# Patient Record
Sex: FEMALE | Race: BLACK OR AFRICAN AMERICAN | NOT HISPANIC OR LATINO
[De-identification: names, ages, dates, MRNs, and addresses within clinical notes are randomized per-mention and may not be internally consistent; named-entity substitution may affect disease eponyms.]

---

## 2017-03-02 ENCOUNTER — NON-APPOINTMENT (OUTPATIENT)
Age: 57
End: 2017-03-02

## 2017-03-02 ENCOUNTER — APPOINTMENT (OUTPATIENT)
Dept: CARDIOLOGY | Facility: CLINIC | Age: 57
End: 2017-03-02

## 2017-03-02 VITALS
SYSTOLIC BLOOD PRESSURE: 126 MMHG | WEIGHT: 205 LBS | OXYGEN SATURATION: 98 % | HEART RATE: 87 BPM | BODY MASS INDEX: 27.77 KG/M2 | HEIGHT: 72 IN | DIASTOLIC BLOOD PRESSURE: 89 MMHG

## 2017-03-02 DIAGNOSIS — Z86.79 PERSONAL HISTORY OF OTHER DISEASES OF THE CIRCULATORY SYSTEM: ICD-10-CM

## 2017-03-03 ENCOUNTER — TRANSCRIPTION ENCOUNTER (OUTPATIENT)
Age: 57
End: 2017-03-03

## 2017-03-07 ENCOUNTER — FORM ENCOUNTER (OUTPATIENT)
Age: 57
End: 2017-03-07

## 2017-03-30 ENCOUNTER — APPOINTMENT (OUTPATIENT)
Dept: CV DIAGNOSITCS | Facility: HOSPITAL | Age: 57
End: 2017-03-30

## 2017-03-30 ENCOUNTER — OUTPATIENT (OUTPATIENT)
Dept: OUTPATIENT SERVICES | Facility: HOSPITAL | Age: 57
LOS: 1 days | End: 2017-03-30
Payer: COMMERCIAL

## 2017-03-30 DIAGNOSIS — I47.1 SUPRAVENTRICULAR TACHYCARDIA: ICD-10-CM

## 2017-03-30 PROCEDURE — 93306 TTE W/DOPPLER COMPLETE: CPT

## 2017-03-30 PROCEDURE — 93306 TTE W/DOPPLER COMPLETE: CPT | Mod: 26

## 2017-05-11 ENCOUNTER — RX RENEWAL (OUTPATIENT)
Age: 57
End: 2017-05-11

## 2017-06-16 ENCOUNTER — OUTPATIENT (OUTPATIENT)
Dept: OUTPATIENT SERVICES | Facility: HOSPITAL | Age: 57
LOS: 1 days | End: 2017-06-16
Payer: COMMERCIAL

## 2017-06-16 ENCOUNTER — APPOINTMENT (OUTPATIENT)
Age: 57
End: 2017-06-16

## 2017-06-16 DIAGNOSIS — M47.16 OTHER SPONDYLOSIS WITH MYELOPATHY, LUMBAR REGION: ICD-10-CM

## 2017-06-16 DIAGNOSIS — M47.817 SPONDYLOSIS WITHOUT MYELOPATHY OR RADICULOPATHY, LUMBOSACRAL REGION: ICD-10-CM

## 2017-06-16 PROCEDURE — 64493 INJ PARAVERT F JNT L/S 1 LEV: CPT

## 2017-06-16 PROCEDURE — 64494 INJ PARAVERT F JNT L/S 2 LEV: CPT

## 2017-07-24 ENCOUNTER — MEDICATION RENEWAL (OUTPATIENT)
Age: 57
End: 2017-07-24

## 2017-09-28 ENCOUNTER — NON-APPOINTMENT (OUTPATIENT)
Age: 57
End: 2017-09-28

## 2017-09-28 ENCOUNTER — APPOINTMENT (OUTPATIENT)
Dept: CARDIOLOGY | Facility: CLINIC | Age: 57
End: 2017-09-28
Payer: COMMERCIAL

## 2017-09-28 VITALS
SYSTOLIC BLOOD PRESSURE: 111 MMHG | OXYGEN SATURATION: 98 % | HEART RATE: 55 BPM | HEIGHT: 72 IN | WEIGHT: 173 LBS | BODY MASS INDEX: 23.43 KG/M2 | DIASTOLIC BLOOD PRESSURE: 75 MMHG

## 2017-09-28 PROCEDURE — 99215 OFFICE O/P EST HI 40 MIN: CPT

## 2017-09-28 PROCEDURE — 93000 ELECTROCARDIOGRAM COMPLETE: CPT

## 2017-11-03 ENCOUNTER — OUTPATIENT (OUTPATIENT)
Dept: OUTPATIENT SERVICES | Facility: HOSPITAL | Age: 57
LOS: 1 days | End: 2017-11-03
Payer: COMMERCIAL

## 2017-11-03 ENCOUNTER — APPOINTMENT (OUTPATIENT)
Age: 57
End: 2017-11-03

## 2017-11-03 DIAGNOSIS — M47.816 SPONDYLOSIS WITHOUT MYELOPATHY OR RADICULOPATHY, LUMBAR REGION: ICD-10-CM

## 2017-11-03 DIAGNOSIS — M54.16 RADICULOPATHY, LUMBAR REGION: ICD-10-CM

## 2017-11-03 PROCEDURE — 64635 DESTROY LUMB/SAC FACET JNT: CPT

## 2017-11-03 PROCEDURE — 64636 DESTROY L/S FACET JNT ADDL: CPT

## 2018-04-20 ENCOUNTER — APPOINTMENT (OUTPATIENT)
Age: 58
End: 2018-04-20

## 2018-04-20 ENCOUNTER — OUTPATIENT (OUTPATIENT)
Dept: OUTPATIENT SERVICES | Facility: HOSPITAL | Age: 58
LOS: 1 days | End: 2018-04-20
Payer: COMMERCIAL

## 2018-04-20 DIAGNOSIS — M54.16 RADICULOPATHY, LUMBAR REGION: ICD-10-CM

## 2018-04-20 PROCEDURE — 64494 INJ PARAVERT F JNT L/S 2 LEV: CPT

## 2018-04-20 PROCEDURE — 64493 INJ PARAVERT F JNT L/S 1 LEV: CPT

## 2018-04-23 DIAGNOSIS — M47.816 SPONDYLOSIS WITHOUT MYELOPATHY OR RADICULOPATHY, LUMBAR REGION: ICD-10-CM

## 2018-11-02 ENCOUNTER — APPOINTMENT (OUTPATIENT)
Dept: ANESTHESIOLOGY | Facility: CLINIC | Age: 58
End: 2018-11-02

## 2018-11-02 ENCOUNTER — OUTPATIENT (OUTPATIENT)
Dept: OUTPATIENT SERVICES | Facility: HOSPITAL | Age: 58
LOS: 1 days | End: 2018-11-02
Payer: COMMERCIAL

## 2018-11-02 DIAGNOSIS — M54.16 RADICULOPATHY, LUMBAR REGION: ICD-10-CM

## 2018-11-02 PROCEDURE — 64494 INJ PARAVERT F JNT L/S 2 LEV: CPT

## 2018-11-02 PROCEDURE — 64493 INJ PARAVERT F JNT L/S 1 LEV: CPT

## 2018-11-05 DIAGNOSIS — M47.817 SPONDYLOSIS WITHOUT MYELOPATHY OR RADICULOPATHY, LUMBOSACRAL REGION: ICD-10-CM

## 2019-06-21 ENCOUNTER — OUTPATIENT (OUTPATIENT)
Dept: OUTPATIENT SERVICES | Facility: HOSPITAL | Age: 59
LOS: 1 days | End: 2019-06-21
Payer: COMMERCIAL

## 2019-06-21 ENCOUNTER — APPOINTMENT (OUTPATIENT)
Dept: MRI IMAGING | Facility: CLINIC | Age: 59
End: 2019-06-21
Payer: COMMERCIAL

## 2019-06-21 DIAGNOSIS — Z00.8 ENCOUNTER FOR OTHER GENERAL EXAMINATION: ICD-10-CM

## 2019-06-21 PROCEDURE — A9585: CPT

## 2019-06-21 PROCEDURE — 75561 CARDIAC MRI FOR MORPH W/DYE: CPT | Mod: 26

## 2019-06-21 PROCEDURE — 75561 CARDIAC MRI FOR MORPH W/DYE: CPT

## 2019-06-24 ENCOUNTER — APPOINTMENT (OUTPATIENT)
Dept: SURGERY | Facility: CLINIC | Age: 59
End: 2019-06-24
Payer: COMMERCIAL

## 2019-06-24 DIAGNOSIS — Z86.2 PERSONAL HISTORY OF DISEASES OF THE BLOOD AND BLOOD-FORMING ORGANS AND CERTAIN DISORDERS INVOLVING THE IMMUNE MECHANISM: ICD-10-CM

## 2019-06-24 PROCEDURE — 99204 OFFICE O/P NEW MOD 45 MIN: CPT

## 2019-06-24 NOTE — CONSULT LETTER
[Dear  ___] : Dear  [unfilled], [Consult Letter:] : I had the pleasure of evaluating your patient, [unfilled]. [Consult Closing:] : Thank you very much for allowing me to participate in the care of this patient.  If you have any questions, please do not hesitate to contact me. [Please see my note below.] : Please see my note below. [FreeTextEntry3] : Sincerely yours,\par \par Chandrika Ortiz MD, FACS\par Assistant Professor of Surgery\par [facility] [FreeTextEntry2] : Dr. Lynn Allen [DrDarya  ___] : Dr. NELSON

## 2019-06-24 NOTE — HISTORY OF PRESENT ILLNESS
[FreeTextEntry1] : Patient referred by Dr. Allen for evaluation of newly diagnosed right breast DCIS. Bilateral mammogram March 2019 additional views recommended. April 19, 2019: 4 mm group of amorphous calcifications right anterior upper outer breast. Core biopsy performed May 3, 2019: DCIS low nuclear grade. Patient denies breast mass, nipple discharge or prior biopsy. Menarche 15, G6, P3, menopause at 49, denies HRT

## 2019-06-24 NOTE — PHYSICAL EXAM
[Normocephalic] : normocephalic [EOMI] : extra ocular movement intact [Sclera nonicteric] : sclera nonicteric [Supple] : supple [No Supraclavicular Adenopathy] : no supraclavicular adenopathy [No Cervical Adenopathy] : no cervical adenopathy [No Thyromegaly] : no thyromegaly [Examined in the supine and seated position] : examined in the supine and seated position [Symmetrical] : symmetrical [No dominant masses] : no dominant masses in right breast  [No dominant masses] : no dominant masses left breast [No Nipple Retraction] : no left nipple retraction [No Nipple Discharge] : no right nipple discharge [Soft] : abdomen soft [No Axillary Lymphadenopathy] : no left axillary lymphadenopathy [No Swelling] : no swelling [No Rashes] : no rashes [de-identified] : tall thin woman

## 2019-06-24 NOTE — ASSESSMENT
[FreeTextEntry1] : Patient with newly diagnosed right breast DCIS. I've requested a breast MRI for preoperative evaluation. If localized, right  damon  guided partial mastectomy would be appropriate. Possible need of radiation and/or additional treatment discussed. Risks reviewed, questions answered. Patient will contact the office to schedule procedure.

## 2019-06-28 ENCOUNTER — FORM ENCOUNTER (OUTPATIENT)
Age: 59
End: 2019-06-28

## 2019-06-29 ENCOUNTER — APPOINTMENT (OUTPATIENT)
Dept: MRI IMAGING | Facility: IMAGING CENTER | Age: 59
End: 2019-06-29
Payer: COMMERCIAL

## 2019-06-29 ENCOUNTER — OUTPATIENT (OUTPATIENT)
Dept: OUTPATIENT SERVICES | Facility: HOSPITAL | Age: 59
LOS: 1 days | End: 2019-06-29
Payer: COMMERCIAL

## 2019-06-29 DIAGNOSIS — D05.11 INTRADUCTAL CARCINOMA IN SITU OF RIGHT BREAST: ICD-10-CM

## 2019-06-29 PROCEDURE — C8937: CPT

## 2019-06-29 PROCEDURE — A9585: CPT

## 2019-06-29 PROCEDURE — 77049 MRI BREAST C-+ W/CAD BI: CPT | Mod: 26

## 2019-06-29 PROCEDURE — C8908: CPT

## 2019-07-17 ENCOUNTER — FORM ENCOUNTER (OUTPATIENT)
Age: 59
End: 2019-07-17

## 2019-07-18 ENCOUNTER — RESULT REVIEW (OUTPATIENT)
Age: 59
End: 2019-07-18

## 2019-07-18 ENCOUNTER — OUTPATIENT (OUTPATIENT)
Dept: OUTPATIENT SERVICES | Facility: HOSPITAL | Age: 59
LOS: 1 days | End: 2019-07-18
Payer: COMMERCIAL

## 2019-07-18 ENCOUNTER — EMERGENCY (EMERGENCY)
Facility: HOSPITAL | Age: 59
LOS: 1 days | Discharge: HOME | End: 2019-07-18
Attending: EMERGENCY MEDICINE | Admitting: EMERGENCY MEDICINE
Payer: COMMERCIAL

## 2019-07-18 ENCOUNTER — FORM ENCOUNTER (OUTPATIENT)
Age: 59
End: 2019-07-18

## 2019-07-18 ENCOUNTER — APPOINTMENT (OUTPATIENT)
Dept: MRI IMAGING | Facility: IMAGING CENTER | Age: 59
End: 2019-07-18
Payer: COMMERCIAL

## 2019-07-18 VITALS
OXYGEN SATURATION: 100 % | DIASTOLIC BLOOD PRESSURE: 70 MMHG | RESPIRATION RATE: 18 BRPM | TEMPERATURE: 99 F | HEART RATE: 77 BPM | SYSTOLIC BLOOD PRESSURE: 101 MMHG

## 2019-07-18 VITALS
RESPIRATION RATE: 18 BRPM | HEART RATE: 57 BPM | DIASTOLIC BLOOD PRESSURE: 83 MMHG | SYSTOLIC BLOOD PRESSURE: 124 MMHG | TEMPERATURE: 98 F | OXYGEN SATURATION: 100 %

## 2019-07-18 DIAGNOSIS — Z00.8 ENCOUNTER FOR OTHER GENERAL EXAMINATION: ICD-10-CM

## 2019-07-18 LAB
ALBUMIN SERPL ELPH-MCNC: 3.9 G/DL — SIGNIFICANT CHANGE UP (ref 3.3–5)
ALP SERPL-CCNC: 68 U/L — SIGNIFICANT CHANGE UP (ref 40–120)
ALT FLD-CCNC: 21 U/L — SIGNIFICANT CHANGE UP (ref 4–33)
ANION GAP SERPL CALC-SCNC: 11 MMO/L — SIGNIFICANT CHANGE UP (ref 7–14)
AST SERPL-CCNC: 17 U/L — SIGNIFICANT CHANGE UP (ref 4–32)
BASOPHILS # BLD AUTO: 0.05 K/UL — SIGNIFICANT CHANGE UP (ref 0–0.2)
BASOPHILS NFR BLD AUTO: 1.4 % — SIGNIFICANT CHANGE UP (ref 0–2)
BILIRUB SERPL-MCNC: 1.2 MG/DL — SIGNIFICANT CHANGE UP (ref 0.2–1.2)
BUN SERPL-MCNC: 16 MG/DL — SIGNIFICANT CHANGE UP (ref 7–23)
CALCIUM SERPL-MCNC: 9.7 MG/DL — SIGNIFICANT CHANGE UP (ref 8.4–10.5)
CHLORIDE SERPL-SCNC: 106 MMOL/L — SIGNIFICANT CHANGE UP (ref 98–107)
CO2 SERPL-SCNC: 26 MMOL/L — SIGNIFICANT CHANGE UP (ref 22–31)
CREAT SERPL-MCNC: 0.88 MG/DL — SIGNIFICANT CHANGE UP (ref 0.5–1.3)
EOSINOPHIL # BLD AUTO: 0.03 K/UL — SIGNIFICANT CHANGE UP (ref 0–0.5)
EOSINOPHIL NFR BLD AUTO: 0.8 % — SIGNIFICANT CHANGE UP (ref 0–6)
GLUCOSE SERPL-MCNC: 105 MG/DL — HIGH (ref 70–99)
HCT VFR BLD CALC: 39 % — SIGNIFICANT CHANGE UP (ref 34.5–45)
HGB BLD-MCNC: 12.5 G/DL — SIGNIFICANT CHANGE UP (ref 11.5–15.5)
IMM GRANULOCYTES NFR BLD AUTO: 0.3 % — SIGNIFICANT CHANGE UP (ref 0–1.5)
LYMPHOCYTES # BLD AUTO: 0.72 K/UL — LOW (ref 1–3.3)
LYMPHOCYTES # BLD AUTO: 19.9 % — SIGNIFICANT CHANGE UP (ref 13–44)
MCHC RBC-ENTMCNC: 31.9 PG — SIGNIFICANT CHANGE UP (ref 27–34)
MCHC RBC-ENTMCNC: 32.1 % — SIGNIFICANT CHANGE UP (ref 32–36)
MCV RBC AUTO: 99.5 FL — SIGNIFICANT CHANGE UP (ref 80–100)
MONOCYTES # BLD AUTO: 0.33 K/UL — SIGNIFICANT CHANGE UP (ref 0–0.9)
MONOCYTES NFR BLD AUTO: 9.1 % — SIGNIFICANT CHANGE UP (ref 2–14)
NEUTROPHILS # BLD AUTO: 2.48 K/UL — SIGNIFICANT CHANGE UP (ref 1.8–7.4)
NEUTROPHILS NFR BLD AUTO: 68.5 % — SIGNIFICANT CHANGE UP (ref 43–77)
NRBC # FLD: 0 K/UL — SIGNIFICANT CHANGE UP (ref 0–0)
PLATELET # BLD AUTO: 158 K/UL — SIGNIFICANT CHANGE UP (ref 150–400)
PMV BLD: 10.6 FL — SIGNIFICANT CHANGE UP (ref 7–13)
POTASSIUM SERPL-MCNC: 3.7 MMOL/L — SIGNIFICANT CHANGE UP (ref 3.5–5.3)
POTASSIUM SERPL-SCNC: 3.7 MMOL/L — SIGNIFICANT CHANGE UP (ref 3.5–5.3)
PROT SERPL-MCNC: 6.9 G/DL — SIGNIFICANT CHANGE UP (ref 6–8.3)
RBC # BLD: 3.92 M/UL — SIGNIFICANT CHANGE UP (ref 3.8–5.2)
RBC # FLD: 13.1 % — SIGNIFICANT CHANGE UP (ref 10.3–14.5)
SODIUM SERPL-SCNC: 143 MMOL/L — SIGNIFICANT CHANGE UP (ref 135–145)
TROPONIN T, HIGH SENSITIVITY: < 6 NG/L — SIGNIFICANT CHANGE UP (ref ?–14)
TROPONIN T, HIGH SENSITIVITY: < 6 NG/L — SIGNIFICANT CHANGE UP (ref ?–14)
WBC # BLD: 3.62 K/UL — LOW (ref 3.8–10.5)
WBC # FLD AUTO: 3.62 K/UL — LOW (ref 3.8–10.5)

## 2019-07-18 PROCEDURE — 71045 X-RAY EXAM CHEST 1 VIEW: CPT | Mod: 26

## 2019-07-18 PROCEDURE — A9585: CPT

## 2019-07-18 PROCEDURE — 77065 DX MAMMO INCL CAD UNI: CPT

## 2019-07-18 PROCEDURE — 88305 TISSUE EXAM BY PATHOLOGIST: CPT | Mod: 26

## 2019-07-18 PROCEDURE — 19085 BX BREAST 1ST LESION MR IMAG: CPT

## 2019-07-18 PROCEDURE — 19085 BX BREAST 1ST LESION MR IMAG: CPT | Mod: RT

## 2019-07-18 PROCEDURE — 93010 ELECTROCARDIOGRAM REPORT: CPT

## 2019-07-18 PROCEDURE — 99285 EMERGENCY DEPT VISIT HI MDM: CPT | Mod: 25

## 2019-07-18 PROCEDURE — 88305 TISSUE EXAM BY PATHOLOGIST: CPT

## 2019-07-18 RX ORDER — SODIUM CHLORIDE 9 MG/ML
1000 INJECTION INTRAMUSCULAR; INTRAVENOUS; SUBCUTANEOUS ONCE
Refills: 0 | Status: COMPLETED | OUTPATIENT
Start: 2019-07-18 | End: 2019-07-18

## 2019-07-18 RX ADMIN — SODIUM CHLORIDE 1000 MILLILITER(S): 9 INJECTION INTRAMUSCULAR; INTRAVENOUS; SUBCUTANEOUS at 17:40

## 2019-07-18 NOTE — ED ADULT NURSE NOTE - OBJECTIVE STATEMENT
Pt received in intake spot 3, A&Ox4, NAD.  c/o near syncopal episode during biopsy after receiving lido with epi.  Pt states started to feel hot, dizzy and diaphoretic.  22g to R AC, placed by EMS, flushes without difficulty.  Labs sent.  Will continue to monitor.

## 2019-07-18 NOTE — ED PROVIDER NOTE - OBJECTIVE STATEMENT
59F hx of HTN, possible sarcoidosis (pt states pet scan/MRI was negative but has pulmonary calcifications), slightly large aorta' 59F hx of HTN, afib on aspirin, possible sarcoidosis (pt states pet scan/MRI was negative but has pulmonary calcifications), slightly large aortic valve' presenting with near syncope event while on the MRI table while getting an MRI guided left breast biopsy. Patient states she was initially uncomfortable on the table, 'feeling stuffy', with right shoulder pain due to the way her arms were situated on the table. States when they injected the lido/with epi at the breast biopsy site and IV (15 cc total dosed separately), she felt like she would faint. Never lost consciousness,  No nausea/vomiting. No dizziness, no weakness/ numbness, Notes blurry vision x 6 months. Per doctor who did biopsy, symptoms lasted like 10 min together, and pulses felt 'thready and slow', but vitals were not able to be obtained. Last cardiology appt was 2 weeks ago, has one scheduled for tomorrow. 59F hx of HTN, afib on aspirin, possible sarcoidosis (pt states pet scan/MRI was negative but has pulmonary calcifications), slightly large aortic valve' presenting with near syncope event approx 2-230pm while on the MRI table while getting an MRI guided left breast biopsy. Patient states she was initially uncomfortable on the table, 'feeling stuffy', with right shoulder pain due to the way her arms were situated on the table. States when they injected the lido/with epi at the breast biopsy site and IV (15 cc total dosed separately), she felt like she would faint. Never lost consciousness,  No nausea/vomiting. No dizziness, no weakness/ numbness, Notes blurry vision x 6 months. Per doctor who did biopsy, symptoms lasted like 10 min together, and pulses felt 'thready and slow', but vitals were not able to be obtained. Last cardiology appt was 2 weeks ago, has one scheduled for tomorrow.

## 2019-07-18 NOTE — ED PROVIDER NOTE - PROGRESS NOTE DETAILS
Pandey PGY3: reassessed, ambulatory, alert, asymptomatic, after rpt trop, stable for dc and f/u, has cards appt tomorrow, will sign out to night team. Caprice Saucedo MD PGY-2 pt repeat trop <6. No further bleeding after bandage placed over biopsy site. encouraged to f/u with team that performed biopsy tomorrow. Will give strict return precautions Caprice Saucedo MD PGY-2 pt repeat trop <6. No further bleeding after bandage placed over biopsy site. encouraged to f/u with team that performed biopsy tomorrow. Will give strict return precautions. Info provided on near-syncope

## 2019-07-18 NOTE — ED PROVIDER NOTE - CLINICAL SUMMARY MEDICAL DECISION MAKING FREE TEXT BOX
59F with presyncope, likely vasovagal in the setting of being face down for MRI and discomfort, will check labs, rehydrate, xray, ekg wnl, sinus, and reassess

## 2019-07-18 NOTE — ED PROVIDER NOTE - NSFOLLOWUPINSTRUCTIONS_ED_ALL_ED_FT
1. You were seen in the Emergency Room for feeling faint. Your heart enzymes were within normal limits  2. Please follow up with your doctor who performed the biopsy tomorrow and your cardiologist tomorrow as scheduled  3. Return to the emergency room or seek immediate assistance for any new or concerning symptoms (such as fevers, chills, worsening pain or bleeding, worsening lightheadedness or feeling faint ), or if you get worse.   4. Copies of your tests were provided to you for follow-up.  You must address all your findings with your doctor.

## 2019-07-18 NOTE — ED PROVIDER NOTE - ATTENDING CONTRIBUTION TO CARE
59F h/o afib on aspirin presents with near syncope. Was getting an MRI guided breast biopsy, states she was feeling very uncomfortable, got an injection and had local anesthesia, started to feel weak and lightheadedness. No CP. By report she had a “thready pulse” but unable to get vitals immediately. No prior cardiac hx. Now feels well. Has cardiology appt tomorrow. On exam well appearing, nad, mmm, rrr, lungs clear, abd soft NT/ND, 2+ pulses, no edema, no rash, alert, speech clear. EKG no acute ischemia or arrythmia. Symptoms c/w vasovagal episode. Plan for labs, re-eval, already has close cards f/u

## 2019-07-18 NOTE — ED ADULT TRIAGE NOTE - CHIEF COMPLAINT QUOTE
Pt was having MRI  of right  breast after receiving Lidocaine with epi became diaphoretic  and felt like passing put. Pt did not have any chest pain. Pt arrives with iv to Right a/c. 600 cc ns received.

## 2019-07-19 ENCOUNTER — RESULT REVIEW (OUTPATIENT)
Age: 59
End: 2019-07-19

## 2019-07-19 PROCEDURE — 77065 DX MAMMO INCL CAD UNI: CPT | Mod: 26,RT

## 2019-07-26 ENCOUNTER — OUTPATIENT (OUTPATIENT)
Dept: OUTPATIENT SERVICES | Facility: HOSPITAL | Age: 59
LOS: 1 days | End: 2019-07-26

## 2019-07-26 VITALS
TEMPERATURE: 98 F | DIASTOLIC BLOOD PRESSURE: 65 MMHG | WEIGHT: 184.97 LBS | HEIGHT: 72 IN | OXYGEN SATURATION: 100 % | SYSTOLIC BLOOD PRESSURE: 100 MMHG | HEART RATE: 60 BPM | RESPIRATION RATE: 16 BRPM

## 2019-07-26 DIAGNOSIS — Z98.890 OTHER SPECIFIED POSTPROCEDURAL STATES: Chronic | ICD-10-CM

## 2019-07-26 DIAGNOSIS — Z98.51 TUBAL LIGATION STATUS: Chronic | ICD-10-CM

## 2019-07-26 DIAGNOSIS — Z01.818 ENCOUNTER FOR OTHER PREPROCEDURAL EXAMINATION: ICD-10-CM

## 2019-07-26 DIAGNOSIS — C50.911 MALIGNANT NEOPLASM OF UNSPECIFIED SITE OF RIGHT FEMALE BREAST: ICD-10-CM

## 2019-07-26 RX ORDER — SODIUM CHLORIDE 9 MG/ML
1000 INJECTION, SOLUTION INTRAVENOUS
Refills: 0 | Status: DISCONTINUED | OUTPATIENT
Start: 2019-08-09 | End: 2019-08-09

## 2019-07-26 RX ORDER — SODIUM CHLORIDE 9 MG/ML
3 INJECTION INTRAMUSCULAR; INTRAVENOUS; SUBCUTANEOUS ONCE
Refills: 0 | Status: DISCONTINUED | OUTPATIENT
Start: 2019-08-09 | End: 2019-08-30

## 2019-07-26 RX ORDER — METOPROLOL TARTRATE 50 MG
1 TABLET ORAL
Qty: 0 | Refills: 0 | DISCHARGE

## 2019-07-26 RX ORDER — ASPIRIN/CALCIUM CARB/MAGNESIUM 324 MG
1 TABLET ORAL
Qty: 0 | Refills: 0 | DISCHARGE

## 2019-07-26 NOTE — H&P PST ADULT - BREAST SYMPTOMS
breast lump R/breast tenderness R breast tenderness R/breast lump R/Reports lump and tenderness appeared after biopsy

## 2019-07-26 NOTE — H&P PST ADULT - NEGATIVE GENERAL GENITOURINARY SYMPTOMS
no urine discoloration/no dysuria/normal urinary frequency/no hematuria/no flank pain R/no flank pain L

## 2019-07-26 NOTE — H&P PST ADULT - NSICDXPROBLEM_GEN_ALL_CORE_FT
DX:    Malignant neoplasm of right breast  Plan: Patient scheduled for surgery on 8/9/19  Patient provided with verbal and written presurgical instructions, patient verbalized understanding  with teach back.     Patient provided with famotidine for GI prophylaxis, patient verbalized understanding.    Patient provided with Chlorhexidine wash, verbal and written instructions. Patient verbalized understanding with teach back.  As per surgeon and PST request cardiac and medical clearance in chart. Recent echo requested    Dx Atrial fibrillation  Patient taking aspirin 325 mg daily**  Patient instructed to take beta blocker as prescribed DX:    Malignant neoplasm of right breast  Plan: Patient scheduled for surgery on 8/9/19  Patient provided with verbal and written presurgical instructions, patient verbalized understanding  with teach back.     Patient provided with famotidine for GI prophylaxis, patient verbalized understanding.    Patient provided with Chlorhexidine wash, verbal and written instructions. Patient verbalized understanding with teach back.  As per surgeon and PST request cardiac and medical clearance done and requested. Recent echo requested    Dx Atrial fibrillation  Patient taking aspirin 325 mg daily**  Patient instructed to take beta blocker as prescribed DX:    Malignant neoplasm of right breast  Plan: Patient scheduled for surgery on 8/9/19  Patient provided with verbal and written presurgical instructions, patient verbalized understanding  with teach back.     Patient provided with famotidine for GI prophylaxis, patient verbalized understanding.    Patient provided with Chlorhexidine wash, verbal and written instructions. Patient verbalized understanding with teach back.  Medical and cardiac clearance requested by surgeon , will obtain document and recent echo    Dx Atrial fibrillation  Patient taking aspirin 325 mg daily, message left with Radha at  Dr Buck office, pending call back for aspirin plan  Patient instructed to take beta blocker as prescribed DX:    Malignant neoplasm of right breast  Plan: Patient scheduled for surgery on 8/9/19  Patient provided with verbal and written presurgical instructions, patient verbalized understanding  with teach back.     Patient provided with famotidine for GI prophylaxis, patient verbalized understanding.    Patient provided with Chlorhexidine wash, verbal and written instructions. Patient verbalized understanding with teach back.  Medical and cardiac clearance requested by surgeon , will obtain document and recent echo    Dx Atrial fibrillation  Patient taking aspirin 325 mg daily, Case discussed with Dr Zhang patient to continue aspirin, surgeon notified by email  Patient instructed to take beta blocker as prescribed DX:    Malignant neoplasm of right breast  Plan: Patient scheduled for surgery on 8/9/19  Patient provided with verbal and written presurgical instructions, patient verbalized understanding  with teach back.     Patient provided with famotidine for GI prophylaxis, patient verbalized understanding.    Patient provided with Chlorhexidine wash, verbal and written instructions. Patient verbalized understanding with teach back.  Medical and cardiac clearance requested by surgeon and PST, will obtain document and recent echo    Dx Atrial fibrillation  Patient taking aspirin 325 mg daily, Case discussed with Dr Zhang patient to continue aspirin, surgeon notified by email  Patient instructed to take beta blocker as prescribed

## 2019-07-26 NOTE — H&P PST ADULT - ASSESSMENT
59 year old female presents with abnormal mammogram results of right breat 59 year old female presents with abnormal mammogram results of right breast

## 2019-07-26 NOTE — H&P PST ADULT - HISTORY OF PRESENT ILLNESS
Patient presents today with past medical history of A-FIB,  Presents today for presurgical evaluation for ... on ... Patient presents today with past medical history of controlled A-FIB currently managed with beta blocker and full dose aspirin,  Patient reports an abnoemal mammogram in April, needle biopsy indicated malignancy. Presents today for presurgical evaluation for right partial mastectomy with wire braketted localization on 8/9/19 Patient presents today with past medical history of controlled A-FIB currently managed with beta blocker and full dose aspirin,  Patient reports an abnormal mammogram in April, needle biopsy indicated malignancy. Presents today for presurgical evaluation for right partial mastectomy with wire braketted localization on 8/9/19

## 2019-07-26 NOTE — H&P PST ADULT - NSICDXPASTMEDICALHX_GEN_ALL_CORE_FT
PAST MEDICAL HISTORY:  Atrial fibrillation     History of thoracic aortic aneurysm repair Noted on CT scan 3/2019 PAST MEDICAL HISTORY:  Atrial fibrillation Diagnosed in 2014. controlled, managed with aspirin and beta blocker    History of thoracic aortic aneurysm repair Noted on CT scan 3/2019 PAST MEDICAL HISTORY:  Atrial fibrillation Diagnosed in 2014. controlled, managed with aspirin and beta blocker    Thoracic aortic aneurysm noted on CT scan on 3/2019 PAST MEDICAL HISTORY:  Atrial fibrillation Diagnosed in 2014. controlled, managed with aspirin and beta blocker    Thoracic aortic aneurysm 4 cm noted on CT scan on 3/2019

## 2019-07-26 NOTE — H&P PST ADULT - SYMPTOMS
dyspnea/palpitations/occasional episodes of dyspnea when walking, lasting 1-2 minutes, denies dizziness, LOC,. Paliptaions occur spontaneously preipiated by stress lasting 1-2 minutes denies LOC, chest pain palpitations/occasional episodes of dyspnea when walking, lasting 1-2 minutes, denies dizziness, LOC, CP. Palpitaions occur spontaneously preipiated by stress lasting 1-2 minutes denies LOC, chest pain or CP/dyspnea chronic SOB and palpitations managed with metoprolol/dyspnea/palpitations

## 2019-07-26 NOTE — H&P PST ADULT - NSANTHOSAYNRD_GEN_A_CORE
No. NAOMY screening performed.  STOP BANG Legend: 0-2 = LOW Risk; 3-4 = INTERMEDIATE Risk; 5-8 = HIGH Risk

## 2019-07-26 NOTE — H&P PST ADULT - CARDIOVASCULAR COMMENTS
Pt reports a vaso-vagal response during MRI procedure, denies LOC. Patient went for evaluation at [facility] ER Pt reports a vaso-vagal response earlier this month, denies LOC. Patient went for evaluation at [facility] ER- w/u negative

## 2019-07-26 NOTE — H&P PST ADULT - VENOUS THROMBOEMBOLISM CURRENT STATUS
(1) varicose veins/(1) other risk factor (includes escalating BMI, pack-years of smoking, diabetes requiring insulin, chemotherapy, female gender and length of surgery)/(2) malignancy (present or previous)

## 2019-07-29 PROBLEM — I71.2 THORACIC AORTIC ANEURYSM, WITHOUT RUPTURE: Chronic | Status: ACTIVE | Noted: 2019-07-26

## 2019-08-05 ENCOUNTER — APPOINTMENT (OUTPATIENT)
Dept: SURGERY | Facility: CLINIC | Age: 59
End: 2019-08-05
Payer: COMMERCIAL

## 2019-08-05 PROCEDURE — 99214 OFFICE O/P EST MOD 30 MIN: CPT

## 2019-08-05 NOTE — PHYSICAL EXAM
[Normocephalic] : normocephalic [EOMI] : extra ocular movement intact [Sclera nonicteric] : sclera nonicteric [Supple] : supple [No Supraclavicular Adenopathy] : no supraclavicular adenopathy [No Cervical Adenopathy] : no cervical adenopathy [No Thyromegaly] : no thyromegaly [Examined in the supine and seated position] : examined in the supine and seated position [Symmetrical] : symmetrical [No dominant masses] : no dominant masses in right breast  [No dominant masses] : no dominant masses left breast [No Nipple Retraction] : no left nipple retraction [No Nipple Discharge] : no left nipple discharge [No Axillary Lymphadenopathy] : no left axillary lymphadenopathy [Soft] : abdomen soft [No Swelling] : no swelling [No Rashes] : no rashes [de-identified] : tall thin woman [de-identified] : post biopsy hematoma.

## 2019-08-05 NOTE — ASSESSMENT
[FreeTextEntry1] : Patient with newly diagnosed right breast DCIS.  discussed planned surgery with bracketed  partial mastectomy option of mastectomy discussed.  need of radiation and/or  Possible additional treatment discussed. Risks reviewed, questions answered. Patient will contact the office to schedule procedure.

## 2019-08-05 NOTE — HISTORY OF PRESENT ILLNESS
[FreeTextEntry1] : Patient referred by Dr. Allen for evaluation of newly diagnosed right breast DCIS. Bilateral mammogram March 2019 additional views recommended. April 19, 2019: 4 mm group of amorphous calcifications right anterior upper outer breast. Core biopsy performed May 3, 2019: DCIS low nuclear grade. Patient denies breast mass, nipple discharge or prior biopsy. Menarche 15, G6, P3, menopause at 49, denies HRT\par MRI with extended area, MRI guided biopsy negative but discordant.  Wide excision with bracketed loc scheduled.  Patient returns with multiple questions

## 2019-08-08 ENCOUNTER — FORM ENCOUNTER (OUTPATIENT)
Age: 59
End: 2019-08-08

## 2019-08-09 ENCOUNTER — RESULT REVIEW (OUTPATIENT)
Age: 59
End: 2019-08-09

## 2019-08-09 ENCOUNTER — APPOINTMENT (OUTPATIENT)
Dept: MAMMOGRAPHY | Facility: HOSPITAL | Age: 59
End: 2019-08-09

## 2019-08-09 ENCOUNTER — OUTPATIENT (OUTPATIENT)
Dept: OUTPATIENT SERVICES | Facility: HOSPITAL | Age: 59
LOS: 1 days | Discharge: HOME | End: 2019-08-09
Payer: COMMERCIAL

## 2019-08-09 ENCOUNTER — APPOINTMENT (OUTPATIENT)
Dept: SURGERY | Facility: HOSPITAL | Age: 59
End: 2019-08-09

## 2019-08-09 VITALS
TEMPERATURE: 98 F | SYSTOLIC BLOOD PRESSURE: 101 MMHG | OXYGEN SATURATION: 98 % | DIASTOLIC BLOOD PRESSURE: 67 MMHG | RESPIRATION RATE: 14 BRPM | HEART RATE: 71 BPM

## 2019-08-09 VITALS
OXYGEN SATURATION: 100 % | SYSTOLIC BLOOD PRESSURE: 103 MMHG | DIASTOLIC BLOOD PRESSURE: 65 MMHG | HEART RATE: 63 BPM | HEIGHT: 72 IN | TEMPERATURE: 98 F | RESPIRATION RATE: 15 BRPM | WEIGHT: 182.98 LBS

## 2019-08-09 DIAGNOSIS — C50.911 MALIGNANT NEOPLASM OF UNSPECIFIED SITE OF RIGHT FEMALE BREAST: ICD-10-CM

## 2019-08-09 DIAGNOSIS — Z98.890 OTHER SPECIFIED POSTPROCEDURAL STATES: Chronic | ICD-10-CM

## 2019-08-09 DIAGNOSIS — Z98.51 TUBAL LIGATION STATUS: Chronic | ICD-10-CM

## 2019-08-09 PROCEDURE — 19301 PARTIAL MASTECTOMY: CPT | Mod: RT

## 2019-08-09 PROCEDURE — 19281 PERQ DEVICE BREAST 1ST IMAG: CPT | Mod: RT

## 2019-08-09 PROCEDURE — 88307 TISSUE EXAM BY PATHOLOGIST: CPT | Mod: 26

## 2019-08-09 PROCEDURE — 76098 X-RAY EXAM SURGICAL SPECIMEN: CPT | Mod: 26,76

## 2019-08-09 PROCEDURE — 88305 TISSUE EXAM BY PATHOLOGIST: CPT | Mod: 26

## 2019-08-09 RX ORDER — SODIUM CHLORIDE 9 MG/ML
1000 INJECTION, SOLUTION INTRAVENOUS
Refills: 0 | Status: DISCONTINUED | OUTPATIENT
Start: 2019-08-09 | End: 2019-08-30

## 2019-08-09 RX ORDER — ACETAMINOPHEN 500 MG
2 TABLET ORAL
Qty: 0 | Refills: 0 | DISCHARGE
Start: 2019-08-09

## 2019-08-09 RX ORDER — ASPIRIN/CALCIUM CARB/MAGNESIUM 324 MG
1 TABLET ORAL
Qty: 0 | Refills: 0 | DISCHARGE

## 2019-08-09 RX ORDER — ACETAMINOPHEN 500 MG
650 TABLET ORAL EVERY 6 HOURS
Refills: 0 | Status: DISCONTINUED | OUTPATIENT
Start: 2019-08-09 | End: 2019-08-30

## 2019-08-09 RX ADMIN — SODIUM CHLORIDE 75 MILLILITER(S): 9 INJECTION, SOLUTION INTRAVENOUS at 15:41

## 2019-08-09 NOTE — ASU PATIENT PROFILE, ADULT - PMH
Atrial fibrillation  Diagnosed in 2014. controlled, managed with aspirin and beta blocker  Thoracic aortic aneurysm  4 cm noted on CT scan on 3/2019

## 2019-08-09 NOTE — ASU DISCHARGE PLAN (ADULT/PEDIATRIC) - CARE PROVIDER_API CALL
Chandrika Ortiz (MD)  Surgery  [address], [address]  [place] [address]  Phone: [phone]  Fax: [phone]  Follow Up Time: 2 weeks

## 2019-08-09 NOTE — BRIEF OPERATIVE NOTE - NSICDXBRIEFPOSTOP_GEN_ALL_CORE_FT
POST-OP DIAGNOSIS:  Ductal carcinoma in situ (DCIS) of right breast 09-Aug-2019 14:23:50  Serge Leach

## 2019-08-09 NOTE — BRIEF OPERATIVE NOTE - OPERATION/FINDINGS
R breast lumpectomy for DCIS s/p wire bracketed localization at the 8:00 and 8:30 positions. Primary closure of soft tissue and skin closure.

## 2019-08-09 NOTE — ASU DISCHARGE PLAN (ADULT/PEDIATRIC) - CALL YOUR DOCTOR IF YOU HAVE ANY OF THE FOLLOWING:
Nausea and vomiting that does not stop/Pain not relieved by Medications/Fever greater than (need to indicate Fahrenheit or Celsius)/Wound/Surgical Site with redness, or foul smelling discharge or pus/Bleeding that does not stop/Swelling that gets worse

## 2019-08-09 NOTE — BRIEF OPERATIVE NOTE - SPECIMENS
9 total: 1. R partial mastectomy, 2. R retroareolar margin, 3. R breast medial margin, 4, R breast inferior margin, 5. R breast superior margin, 6. R breast lateral margin, 7. R breast posterior margin, 8. R breast anterior margin, 9. R breast posterolateral margin

## 2019-08-09 NOTE — ASU PATIENT PROFILE, ADULT - HARM RISK FACTORS
Hughes Friends 9/3/19
yes

## 2019-08-09 NOTE — ASU DISCHARGE PLAN (ADULT/PEDIATRIC) - NURSING INSTRUCTIONS
You received IV Tylenol for pain management at _1pm __. Please DO NOT take any Tylenol (Acetaminophen) containing products, such as Vicodin, Percocet, Excedrin, and cold medications for the next 6 hours (until _6__ PM). DO NOT TAKE MORE THAN 3000 MG OF TYLENOL in a 24 hour period.

## 2019-08-09 NOTE — BRIEF OPERATIVE NOTE - NSICDXBRIEFPREOP_GEN_ALL_CORE_FT
PRE-OP DIAGNOSIS:  Ductal carcinoma in situ (DCIS) of right breast 09-Aug-2019 14:23:06  Serge Leach

## 2019-08-13 LAB — SURGICAL PATHOLOGY STUDY: SIGNIFICANT CHANGE UP

## 2019-08-21 ENCOUNTER — APPOINTMENT (OUTPATIENT)
Dept: SURGERY | Facility: CLINIC | Age: 59
End: 2019-08-21
Payer: COMMERCIAL

## 2019-08-21 DIAGNOSIS — D05.11 INTRADUCTAL CARCINOMA IN SITU OF RIGHT BREAST: ICD-10-CM

## 2019-08-21 PROCEDURE — 99024 POSTOP FOLLOW-UP VISIT: CPT

## 2019-08-21 RX ORDER — DULOXETINE HYDROCHLORIDE 30 MG/1
30 CAPSULE, DELAYED RELEASE PELLETS ORAL
Qty: 30 | Refills: 0 | Status: ACTIVE | COMMUNITY
Start: 2019-05-02

## 2019-08-21 RX ORDER — KETOCONAZOLE 20 MG/G
2 CREAM TOPICAL
Qty: 60 | Refills: 0 | Status: ACTIVE | COMMUNITY
Start: 2019-05-10

## 2019-08-21 RX ORDER — MOMETASONE FUROATE 1 MG/G
0.1 OINTMENT TOPICAL
Qty: 45 | Refills: 0 | Status: ACTIVE | COMMUNITY
Start: 2019-04-19

## 2019-08-21 RX ORDER — METOPROLOL TARTRATE 25 MG/1
25 TABLET, FILM COATED ORAL
Qty: 30 | Refills: 0 | Status: ACTIVE | COMMUNITY
Start: 2019-06-14

## 2019-08-21 NOTE — ASSESSMENT
[FreeTextEntry1] : Patient with newly diagnosed right breast DCIS.  doing well postop, recommend consultation with xrt and oncology.  rto 4 mo

## 2019-08-21 NOTE — HISTORY OF PRESENT ILLNESS
[FreeTextEntry1] : Patient referred by Dr. Allen for evaluation of newly diagnosed right breast DCIS. Bilateral mammogram March 2019 additional views recommended. April 19, 2019: 4 mm group of amorphous calcifications right anterior upper outer breast. Core biopsy performed May 3, 2019: DCIS low nuclear grade. Patient denies breast mass, nipple discharge or prior biopsy. Menarche 15, G6, P3, menopause at 49, denies HRT\par MRI with extended area, MRI guided biopsy negative but discordant.  Wide excision with bracketed loc scheduled.  Patient returns with multiple questions\par 8/9/19 right breast partial mastectomy for DCIS, with negative margins.  denies pain or drainage, questions answered.

## 2019-08-21 NOTE — PHYSICAL EXAM
[Normocephalic] : normocephalic [Sclera nonicteric] : sclera nonicteric [EOMI] : extra ocular movement intact [Supple] : supple [No Supraclavicular Adenopathy] : no supraclavicular adenopathy [No Cervical Adenopathy] : no cervical adenopathy [Examined in the supine and seated position] : examined in the supine and seated position [No Thyromegaly] : no thyromegaly [Symmetrical] : symmetrical [No dominant masses] : no dominant masses left breast [No dominant masses] : no dominant masses in right breast  [No Nipple Retraction] : no left nipple retraction [No Nipple Discharge] : no left nipple discharge [No Axillary Lymphadenopathy] : no left axillary lymphadenopathy [Soft] : abdomen soft [No Rashes] : no rashes [No Swelling] : no swelling [de-identified] : tall thin woman [de-identified] : incision with skin avulsion from steristrip.  mild swelling, no erythema  scar min discussed.

## 2019-12-16 ENCOUNTER — APPOINTMENT (OUTPATIENT)
Dept: SURGERY | Facility: CLINIC | Age: 59
End: 2019-12-16
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
## 2020-03-10 ENCOUNTER — APPOINTMENT (OUTPATIENT)
Dept: ANESTHESIOLOGY | Facility: CLINIC | Age: 60
End: 2020-03-10

## 2020-03-10 ENCOUNTER — OUTPATIENT (OUTPATIENT)
Dept: OUTPATIENT SERVICES | Facility: HOSPITAL | Age: 60
LOS: 1 days | End: 2020-03-10
Payer: COMMERCIAL

## 2020-03-10 DIAGNOSIS — M47.16 OTHER SPONDYLOSIS WITH MYELOPATHY, LUMBAR REGION: ICD-10-CM

## 2020-03-10 DIAGNOSIS — Z98.890 OTHER SPECIFIED POSTPROCEDURAL STATES: Chronic | ICD-10-CM

## 2020-03-10 DIAGNOSIS — Z98.51 TUBAL LIGATION STATUS: Chronic | ICD-10-CM

## 2020-03-10 PROCEDURE — 64494 INJ PARAVERT F JNT L/S 2 LEV: CPT

## 2020-03-10 PROCEDURE — 64493 INJ PARAVERT F JNT L/S 1 LEV: CPT

## 2021-09-28 ENCOUNTER — EMERGENCY (EMERGENCY)
Facility: HOSPITAL | Age: 61
LOS: 1 days | Discharge: HOME | End: 2021-09-28
Attending: STUDENT IN AN ORGANIZED HEALTH CARE EDUCATION/TRAINING PROGRAM
Payer: COMMERCIAL

## 2021-09-28 VITALS
WEIGHT: 184.53 LBS | DIASTOLIC BLOOD PRESSURE: 64 MMHG | RESPIRATION RATE: 18 BRPM | HEIGHT: 72 IN | SYSTOLIC BLOOD PRESSURE: 90 MMHG | TEMPERATURE: 98 F | HEART RATE: 60 BPM | OXYGEN SATURATION: 99 %

## 2021-09-28 VITALS
OXYGEN SATURATION: 97 % | RESPIRATION RATE: 18 BRPM | SYSTOLIC BLOOD PRESSURE: 104 MMHG | DIASTOLIC BLOOD PRESSURE: 46 MMHG | HEART RATE: 69 BPM | TEMPERATURE: 98 F

## 2021-09-28 DIAGNOSIS — Z98.890 OTHER SPECIFIED POSTPROCEDURAL STATES: Chronic | ICD-10-CM

## 2021-09-28 DIAGNOSIS — Z98.51 TUBAL LIGATION STATUS: Chronic | ICD-10-CM

## 2021-09-28 LAB
ALBUMIN SERPL ELPH-MCNC: 3.2 G/DL — LOW (ref 3.5–5)
ALP SERPL-CCNC: 96 U/L — SIGNIFICANT CHANGE UP (ref 40–120)
ALT FLD-CCNC: 23 U/L DA — SIGNIFICANT CHANGE UP (ref 10–60)
ANION GAP SERPL CALC-SCNC: 4 MMOL/L — LOW (ref 5–17)
APPEARANCE UR: ABNORMAL
APTT BLD: 32.9 SEC — SIGNIFICANT CHANGE UP (ref 27.5–35.5)
AST SERPL-CCNC: 14 U/L — SIGNIFICANT CHANGE UP (ref 10–40)
BACTERIA # UR AUTO: ABNORMAL /HPF
BASOPHILS # BLD AUTO: 0.06 K/UL — SIGNIFICANT CHANGE UP (ref 0–0.2)
BASOPHILS NFR BLD AUTO: 1.5 % — SIGNIFICANT CHANGE UP (ref 0–2)
BILIRUB SERPL-MCNC: 0.5 MG/DL — SIGNIFICANT CHANGE UP (ref 0.2–1.2)
BILIRUB UR-MCNC: NEGATIVE — SIGNIFICANT CHANGE UP
BUN SERPL-MCNC: 12 MG/DL — SIGNIFICANT CHANGE UP (ref 7–18)
CALCIUM SERPL-MCNC: 9.1 MG/DL — SIGNIFICANT CHANGE UP (ref 8.4–10.5)
CHLORIDE SERPL-SCNC: 105 MMOL/L — SIGNIFICANT CHANGE UP (ref 96–108)
CO2 SERPL-SCNC: 31 MMOL/L — SIGNIFICANT CHANGE UP (ref 22–31)
COLOR SPEC: YELLOW — SIGNIFICANT CHANGE UP
CREAT SERPL-MCNC: 0.76 MG/DL — SIGNIFICANT CHANGE UP (ref 0.5–1.3)
DIFF PNL FLD: ABNORMAL
EOSINOPHIL # BLD AUTO: 0.05 K/UL — SIGNIFICANT CHANGE UP (ref 0–0.5)
EOSINOPHIL NFR BLD AUTO: 1.2 % — SIGNIFICANT CHANGE UP (ref 0–6)
EPI CELLS # UR: ABNORMAL /HPF
GLUCOSE SERPL-MCNC: 85 MG/DL — SIGNIFICANT CHANGE UP (ref 70–99)
GLUCOSE UR QL: NEGATIVE — SIGNIFICANT CHANGE UP
HCT VFR BLD CALC: 41.9 % — SIGNIFICANT CHANGE UP (ref 34.5–45)
HGB BLD-MCNC: 13.5 G/DL — SIGNIFICANT CHANGE UP (ref 11.5–15.5)
HYALINE CASTS # UR AUTO: ABNORMAL /LPF
IMM GRANULOCYTES NFR BLD AUTO: 0.2 % — SIGNIFICANT CHANGE UP (ref 0–1.5)
INR BLD: 1.47 RATIO — HIGH (ref 0.88–1.16)
KETONES UR-MCNC: NEGATIVE — SIGNIFICANT CHANGE UP
LEUKOCYTE ESTERASE UR-ACNC: ABNORMAL
LYMPHOCYTES # BLD AUTO: 1.14 K/UL — SIGNIFICANT CHANGE UP (ref 1–3.3)
LYMPHOCYTES # BLD AUTO: 28.2 % — SIGNIFICANT CHANGE UP (ref 13–44)
MCHC RBC-ENTMCNC: 31.5 PG — SIGNIFICANT CHANGE UP (ref 27–34)
MCHC RBC-ENTMCNC: 32.2 GM/DL — SIGNIFICANT CHANGE UP (ref 32–36)
MCV RBC AUTO: 97.7 FL — SIGNIFICANT CHANGE UP (ref 80–100)
MONOCYTES # BLD AUTO: 0.36 K/UL — SIGNIFICANT CHANGE UP (ref 0–0.9)
MONOCYTES NFR BLD AUTO: 8.9 % — SIGNIFICANT CHANGE UP (ref 2–14)
NEUTROPHILS # BLD AUTO: 2.42 K/UL — SIGNIFICANT CHANGE UP (ref 1.8–7.4)
NEUTROPHILS NFR BLD AUTO: 60 % — SIGNIFICANT CHANGE UP (ref 43–77)
NITRITE UR-MCNC: NEGATIVE — SIGNIFICANT CHANGE UP
NRBC # BLD: 0 /100 WBCS — SIGNIFICANT CHANGE UP (ref 0–0)
NT-PROBNP SERPL-SCNC: 452 PG/ML — HIGH (ref 0–125)
PH UR: 7 — SIGNIFICANT CHANGE UP (ref 5–8)
PLATELET # BLD AUTO: 215 K/UL — SIGNIFICANT CHANGE UP (ref 150–400)
POTASSIUM SERPL-MCNC: 3.9 MMOL/L — SIGNIFICANT CHANGE UP (ref 3.5–5.3)
POTASSIUM SERPL-SCNC: 3.9 MMOL/L — SIGNIFICANT CHANGE UP (ref 3.5–5.3)
PROT SERPL-MCNC: 7.7 G/DL — SIGNIFICANT CHANGE UP (ref 6–8.3)
PROT UR-MCNC: NEGATIVE — SIGNIFICANT CHANGE UP
PROTHROM AB SERPL-ACNC: 17.2 SEC — HIGH (ref 10.6–13.6)
RBC # BLD: 4.29 M/UL — SIGNIFICANT CHANGE UP (ref 3.8–5.2)
RBC # FLD: 12.4 % — SIGNIFICANT CHANGE UP (ref 10.3–14.5)
RBC CASTS # UR COMP ASSIST: ABNORMAL /HPF (ref 0–2)
SARS-COV-2 RNA SPEC QL NAA+PROBE: SIGNIFICANT CHANGE UP
SODIUM SERPL-SCNC: 140 MMOL/L — SIGNIFICANT CHANGE UP (ref 135–145)
SP GR SPEC: 1 — LOW (ref 1.01–1.02)
TROPONIN I SERPL-MCNC: <0.015 NG/ML — SIGNIFICANT CHANGE UP (ref 0–0.04)
UROBILINOGEN FLD QL: NEGATIVE — SIGNIFICANT CHANGE UP
WBC # BLD: 4.04 K/UL — SIGNIFICANT CHANGE UP (ref 3.8–10.5)
WBC # FLD AUTO: 4.04 K/UL — SIGNIFICANT CHANGE UP (ref 3.8–10.5)
WBC UR QL: ABNORMAL /HPF (ref 0–5)

## 2021-09-28 PROCEDURE — 85610 PROTHROMBIN TIME: CPT

## 2021-09-28 PROCEDURE — 80053 COMPREHEN METABOLIC PANEL: CPT

## 2021-09-28 PROCEDURE — 99284 EMERGENCY DEPT VISIT MOD MDM: CPT | Mod: 25

## 2021-09-28 PROCEDURE — 71045 X-RAY EXAM CHEST 1 VIEW: CPT | Mod: 26

## 2021-09-28 PROCEDURE — 84484 ASSAY OF TROPONIN QUANT: CPT

## 2021-09-28 PROCEDURE — 93005 ELECTROCARDIOGRAM TRACING: CPT

## 2021-09-28 PROCEDURE — 96374 THER/PROPH/DIAG INJ IV PUSH: CPT

## 2021-09-28 PROCEDURE — 81001 URINALYSIS AUTO W/SCOPE: CPT

## 2021-09-28 PROCEDURE — 36415 COLL VENOUS BLD VENIPUNCTURE: CPT

## 2021-09-28 PROCEDURE — 85730 THROMBOPLASTIN TIME PARTIAL: CPT

## 2021-09-28 PROCEDURE — 70450 CT HEAD/BRAIN W/O DYE: CPT | Mod: MA

## 2021-09-28 PROCEDURE — 99285 EMERGENCY DEPT VISIT HI MDM: CPT

## 2021-09-28 PROCEDURE — 83880 ASSAY OF NATRIURETIC PEPTIDE: CPT

## 2021-09-28 PROCEDURE — 82962 GLUCOSE BLOOD TEST: CPT

## 2021-09-28 PROCEDURE — 87635 SARS-COV-2 COVID-19 AMP PRB: CPT

## 2021-09-28 PROCEDURE — 93010 ELECTROCARDIOGRAM REPORT: CPT

## 2021-09-28 PROCEDURE — 70450 CT HEAD/BRAIN W/O DYE: CPT | Mod: 26,MA

## 2021-09-28 PROCEDURE — 85025 COMPLETE CBC W/AUTO DIFF WBC: CPT

## 2021-09-28 PROCEDURE — 71045 X-RAY EXAM CHEST 1 VIEW: CPT

## 2021-09-28 RX ORDER — CEFTRIAXONE 500 MG/1
1000 INJECTION, POWDER, FOR SOLUTION INTRAMUSCULAR; INTRAVENOUS ONCE
Refills: 0 | Status: COMPLETED | OUTPATIENT
Start: 2021-09-28 | End: 2021-09-28

## 2021-09-28 RX ORDER — CEPHALEXIN 500 MG
1 CAPSULE ORAL
Qty: 30 | Refills: 0
Start: 2021-09-28 | End: 2021-10-07

## 2021-09-28 RX ADMIN — CEFTRIAXONE 100 MILLIGRAM(S): 500 INJECTION, POWDER, FOR SOLUTION INTRAMUSCULAR; INTRAVENOUS at 21:49

## 2021-09-28 NOTE — ED PROVIDER NOTE - CLINICAL SUMMARY MEDICAL DECISION MAKING FREE TEXT BOX
62 y/o female h/o CVA with complete R sided residual weakness and expressive aphasia, presents with questionable syncope vs transient alteration in mentation. Possible syncope vs infectious vs metabolic. Will obtain labs and imaging. Will reassess.

## 2021-09-28 NOTE — ED ADULT NURSE NOTE - NSIMPLEMENTINTERV_GEN_ALL_ED
Implemented All Fall with Harm Risk Interventions:  [place] to call system. Call bell, personal items and telephone within reach. Instruct patient to call for assistance. Room bathroom lighting operational. Non-slip footwear when patient is off stretcher. Physically safe environment: no spills, clutter or unnecessary equipment. Stretcher in lowest position, wheels locked, appropriate side rails in place. Provide visual cue, wrist band, yellow gown, etc. Monitor gait and stability. Monitor for mental status changes and reorient to person, place, and time. Review medications for side effects contributing to fall risk. Reinforce activity limits and safety measures with patient and family. Provide visual clues: red socks.

## 2021-09-28 NOTE — ED PROVIDER NOTE - NSICDXPASTMEDICALHX_GEN_ALL_CORE_FT
PAST MEDICAL HISTORY:  Atrial fibrillation Diagnosed in 2014. controlled, managed with aspirin and beta blocker    Thoracic aortic aneurysm 4 cm noted on CT scan on 3/2019      CVA (cerebrovascular accident)

## 2021-09-28 NOTE — ED ADULT NURSE NOTE - OBJECTIVE STATEMENT
From [facility] as per EMS patient is hypotensive sp syncopal event, out for 2-3 minutes. From [facility] as per EMS patient is hypotensive sp syncopal event, out for 2-3 minutes. Patient noted to have right side paralysis from previous stroke, patient does not remember when. Patient is A&Ox3, able to follow commands, does not remember what happened, why she is in hospital.

## 2021-09-28 NOTE — ED PROVIDER NOTE - PATIENT PORTAL LINK FT
You can access the FollowMyHealth Patient Portal offered by [facility] by registering at the following website: [email or web address]. By joining [facility]’s FollowMyHealth portal, you will also be able to view your health information using other applications (apps) compatible with our system.

## 2021-09-28 NOTE — ED PROVIDER NOTE - PHYSICAL EXAMINATION
Sohrawardy:   VS reviewed  NAD, not ill-appearing, comfortable and sitting upright in stretcher  awake and alert, oriented to person and place  nc/at, neck rom normal, supple  rrr, s1s2, no jvd, no pitting edema  normal resp effort  lungs ctab, no w/r/r  abdomen soft, nd/nt   no rash  cn intact, 0/5 strength in R upper and lower extremity with appearance of chronic muscle changes, 5/5 strength in L upper and lower extremities, sensation intact, intermittent expressive aphasia were pt repeats "I don't know," but is able to answer questions and follow commands

## 2021-09-28 NOTE — ED PROVIDER NOTE - OBJECTIVE STATEMENT
60 y/o female h/o CVA with complete R sided residual weakness and expressive aphasia, presents with questionable syncope vs transient alteration in mentation. Pt was sent by nursing home. They were reporting that for a few minutes pt was awake but not responding to them. BP at that time was found to be low and so pt was sent to the ER. Here pt states she does not know what happened and she currently has no complaints. Denies fever, chills, chest pain, shortness of breath, new weakness, or paresthesia. Per nursing home paperwork and EMS pt's is at baseline deficits when brought to the ED. As per nursing home staff, pt does not appear to have new deficits.

## 2022-06-09 NOTE — ED ADULT NURSE NOTE - NSFALLRSKINDICATORS_ED_ALL_ED
[facility] Transitions Initial Follow Up Call    Call within 2 business days of discharge: Yes    Patient: Robinson Law Patient : 1966   MRN: [id number]  Reason for Admission: AMS   Discharge Date: 22 RARS: Readmission Risk Score: 47.4 ( )      Last Discharge [facility]       Complaint Diagnosis Description Type Department Provider    22 Altered Mental Status Shortness of breath . .. ED to Hosp-Admission (Discharged) (ADMITTED) [place] B3 Anirudh Alexis MD; Jonothan Goldberg. .. Attempted to reach patient via phone for initial post hospital transition call. VM left stating purpose of call along with my contact information requesting a return call.           Care Transitions 24 Hour Call    Do you have all of your prescriptions and are they filled?: Yes  Care Transitions Interventions         Follow Up  Future Appointments   Date Time Provider Nelly Darby   2022  5:15 PM Rod Nickerson  [facility]   2022  2:15 PM Amber Claire APRN - ILAN RALPH, RN, [facility]  Care Transition Nurse  [phone] mobile
detailed exam
details…
no

## 2022-06-20 ENCOUNTER — INPATIENT (INPATIENT)
Facility: HOSPITAL | Age: 62
LOS: 6 days | Discharge: NURSING, REHAB OR LONG-TERM CARE | DRG: 392 | End: 2022-06-27
Attending: INTERNAL MEDICINE | Admitting: INTERNAL MEDICINE
Payer: COMMERCIAL

## 2022-06-20 VITALS
SYSTOLIC BLOOD PRESSURE: 111 MMHG | TEMPERATURE: 98 F | RESPIRATION RATE: 16 BRPM | HEIGHT: 72 IN | HEART RATE: 58 BPM | OXYGEN SATURATION: 97 % | DIASTOLIC BLOOD PRESSURE: 71 MMHG | WEIGHT: 169.98 LBS

## 2022-06-20 DIAGNOSIS — Z29.9 ENCOUNTER FOR PROPHYLACTIC MEASURES, UNSPECIFIED: ICD-10-CM

## 2022-06-20 DIAGNOSIS — K52.9 NONINFECTIVE GASTROENTERITIS AND COLITIS, UNSPECIFIED: ICD-10-CM

## 2022-06-20 DIAGNOSIS — Z98.890 OTHER SPECIFIED POSTPROCEDURAL STATES: Chronic | ICD-10-CM

## 2022-06-20 DIAGNOSIS — I48.20 CHRONIC ATRIAL FIBRILLATION, UNSPECIFIED: ICD-10-CM

## 2022-06-20 DIAGNOSIS — I10 ESSENTIAL (PRIMARY) HYPERTENSION: ICD-10-CM

## 2022-06-20 DIAGNOSIS — Z98.51 TUBAL LIGATION STATUS: Chronic | ICD-10-CM

## 2022-06-20 DIAGNOSIS — R10.9 UNSPECIFIED ABDOMINAL PAIN: ICD-10-CM

## 2022-06-20 DIAGNOSIS — E78.5 HYPERLIPIDEMIA, UNSPECIFIED: ICD-10-CM

## 2022-06-20 PROBLEM — I63.9 CEREBRAL INFARCTION, UNSPECIFIED: Chronic | Status: ACTIVE | Noted: 2021-09-28

## 2022-06-20 LAB
ALBUMIN SERPL ELPH-MCNC: 3.2 G/DL — LOW (ref 3.5–5)
ALP SERPL-CCNC: 101 U/L — SIGNIFICANT CHANGE UP (ref 40–120)
ALT FLD-CCNC: 22 U/L DA — SIGNIFICANT CHANGE UP (ref 10–60)
ANION GAP SERPL CALC-SCNC: 5 MMOL/L — SIGNIFICANT CHANGE UP (ref 5–17)
APPEARANCE UR: CLEAR — SIGNIFICANT CHANGE UP
AST SERPL-CCNC: 18 U/L — SIGNIFICANT CHANGE UP (ref 10–40)
BASOPHILS # BLD AUTO: 0.05 K/UL — SIGNIFICANT CHANGE UP (ref 0–0.2)
BASOPHILS NFR BLD AUTO: 1.3 % — SIGNIFICANT CHANGE UP (ref 0–2)
BILIRUB SERPL-MCNC: 0.5 MG/DL — SIGNIFICANT CHANGE UP (ref 0.2–1.2)
BILIRUB UR-MCNC: NEGATIVE — SIGNIFICANT CHANGE UP
BUN SERPL-MCNC: 10 MG/DL — SIGNIFICANT CHANGE UP (ref 7–18)
CALCIUM SERPL-MCNC: 9.1 MG/DL — SIGNIFICANT CHANGE UP (ref 8.4–10.5)
CHLORIDE SERPL-SCNC: 108 MMOL/L — SIGNIFICANT CHANGE UP (ref 96–108)
CO2 SERPL-SCNC: 26 MMOL/L — SIGNIFICANT CHANGE UP (ref 22–31)
COLOR SPEC: YELLOW — SIGNIFICANT CHANGE UP
CREAT SERPL-MCNC: 0.73 MG/DL — SIGNIFICANT CHANGE UP (ref 0.5–1.3)
DIFF PNL FLD: ABNORMAL
EGFR: 94 ML/MIN/1.73M2 — SIGNIFICANT CHANGE UP
EOSINOPHIL # BLD AUTO: 0.06 K/UL — SIGNIFICANT CHANGE UP (ref 0–0.5)
EOSINOPHIL NFR BLD AUTO: 1.5 % — SIGNIFICANT CHANGE UP (ref 0–6)
GLUCOSE SERPL-MCNC: 95 MG/DL — SIGNIFICANT CHANGE UP (ref 70–99)
GLUCOSE UR QL: NEGATIVE — SIGNIFICANT CHANGE UP
HCT VFR BLD CALC: 38.2 % — SIGNIFICANT CHANGE UP (ref 34.5–45)
HGB BLD-MCNC: 12.3 G/DL — SIGNIFICANT CHANGE UP (ref 11.5–15.5)
HIV 1 & 2 AB SERPL IA.RAPID: SIGNIFICANT CHANGE UP
IMM GRANULOCYTES NFR BLD AUTO: 0.3 % — SIGNIFICANT CHANGE UP (ref 0–1.5)
KETONES UR-MCNC: NEGATIVE — SIGNIFICANT CHANGE UP
LEUKOCYTE ESTERASE UR-ACNC: NEGATIVE — SIGNIFICANT CHANGE UP
LYMPHOCYTES # BLD AUTO: 1.04 K/UL — SIGNIFICANT CHANGE UP (ref 1–3.3)
LYMPHOCYTES # BLD AUTO: 26.4 % — SIGNIFICANT CHANGE UP (ref 13–44)
MCHC RBC-ENTMCNC: 31.3 PG — SIGNIFICANT CHANGE UP (ref 27–34)
MCHC RBC-ENTMCNC: 32.2 GM/DL — SIGNIFICANT CHANGE UP (ref 32–36)
MCV RBC AUTO: 97.2 FL — SIGNIFICANT CHANGE UP (ref 80–100)
MONOCYTES # BLD AUTO: 0.35 K/UL — SIGNIFICANT CHANGE UP (ref 0–0.9)
MONOCYTES NFR BLD AUTO: 8.9 % — SIGNIFICANT CHANGE UP (ref 2–14)
NEUTROPHILS # BLD AUTO: 2.43 K/UL — SIGNIFICANT CHANGE UP (ref 1.8–7.4)
NEUTROPHILS NFR BLD AUTO: 61.6 % — SIGNIFICANT CHANGE UP (ref 43–77)
NITRITE UR-MCNC: NEGATIVE — SIGNIFICANT CHANGE UP
NRBC # BLD: 0 /100 WBCS — SIGNIFICANT CHANGE UP (ref 0–0)
PH UR: 7 — SIGNIFICANT CHANGE UP (ref 5–8)
PLATELET # BLD AUTO: 193 K/UL — SIGNIFICANT CHANGE UP (ref 150–400)
POTASSIUM SERPL-MCNC: 4.2 MMOL/L — SIGNIFICANT CHANGE UP (ref 3.5–5.3)
POTASSIUM SERPL-SCNC: 4.2 MMOL/L — SIGNIFICANT CHANGE UP (ref 3.5–5.3)
PROT SERPL-MCNC: 7.7 G/DL — SIGNIFICANT CHANGE UP (ref 6–8.3)
PROT UR-MCNC: NEGATIVE — SIGNIFICANT CHANGE UP
RBC # BLD: 3.93 M/UL — SIGNIFICANT CHANGE UP (ref 3.8–5.2)
RBC # FLD: 13.2 % — SIGNIFICANT CHANGE UP (ref 10.3–14.5)
SARS-COV-2 RNA SPEC QL NAA+PROBE: SIGNIFICANT CHANGE UP
SODIUM SERPL-SCNC: 139 MMOL/L — SIGNIFICANT CHANGE UP (ref 135–145)
SP GR SPEC: 1.01 — SIGNIFICANT CHANGE UP (ref 1.01–1.02)
UROBILINOGEN FLD QL: NEGATIVE — SIGNIFICANT CHANGE UP
WBC # BLD: 3.94 K/UL — SIGNIFICANT CHANGE UP (ref 3.8–10.5)
WBC # FLD AUTO: 3.94 K/UL — SIGNIFICANT CHANGE UP (ref 3.8–10.5)

## 2022-06-20 PROCEDURE — 99285 EMERGENCY DEPT VISIT HI MDM: CPT

## 2022-06-20 PROCEDURE — 74177 CT ABD & PELVIS W/CONTRAST: CPT | Mod: 26,MA

## 2022-06-20 PROCEDURE — 93010 ELECTROCARDIOGRAM REPORT: CPT

## 2022-06-20 RX ORDER — CIPROFLOXACIN LACTATE 400MG/40ML
400 VIAL (ML) INTRAVENOUS ONCE
Refills: 0 | Status: COMPLETED | OUTPATIENT
Start: 2022-06-20 | End: 2022-06-20

## 2022-06-20 RX ORDER — ACETAMINOPHEN 500 MG
650 TABLET ORAL EVERY 6 HOURS
Refills: 0 | Status: DISCONTINUED | OUTPATIENT
Start: 2022-06-20 | End: 2022-06-27

## 2022-06-20 RX ORDER — CIPROFLOXACIN LACTATE 400MG/40ML
400 VIAL (ML) INTRAVENOUS EVERY 12 HOURS
Refills: 0 | Status: DISCONTINUED | OUTPATIENT
Start: 2022-06-20 | End: 2022-06-21

## 2022-06-20 RX ORDER — BACLOFEN 100 %
10 POWDER (GRAM) MISCELLANEOUS EVERY 8 HOURS
Refills: 0 | Status: DISCONTINUED | OUTPATIENT
Start: 2022-06-20 | End: 2022-06-27

## 2022-06-20 RX ORDER — METOPROLOL TARTRATE 50 MG
25 TABLET ORAL DAILY
Refills: 0 | Status: DISCONTINUED | OUTPATIENT
Start: 2022-06-20 | End: 2022-06-25

## 2022-06-20 RX ORDER — METRONIDAZOLE 500 MG
500 TABLET ORAL ONCE
Refills: 0 | Status: COMPLETED | OUTPATIENT
Start: 2022-06-20 | End: 2022-06-20

## 2022-06-20 RX ORDER — CHOLECALCIFEROL (VITAMIN D3) 125 MCG
2000 CAPSULE ORAL DAILY
Refills: 0 | Status: DISCONTINUED | OUTPATIENT
Start: 2022-06-20 | End: 2022-06-27

## 2022-06-20 RX ORDER — MORPHINE SULFATE 50 MG/1
2 CAPSULE, EXTENDED RELEASE ORAL ONCE
Refills: 0 | Status: DISCONTINUED | OUTPATIENT
Start: 2022-06-20 | End: 2022-06-20

## 2022-06-20 RX ORDER — CHOLECALCIFEROL (VITAMIN D3) 125 MCG
1 CAPSULE ORAL
Qty: 0 | Refills: 0 | DISCHARGE

## 2022-06-20 RX ORDER — GABAPENTIN 400 MG/1
100 CAPSULE ORAL THREE TIMES A DAY
Refills: 0 | Status: DISCONTINUED | OUTPATIENT
Start: 2022-06-20 | End: 2022-06-27

## 2022-06-20 RX ORDER — SODIUM CHLORIDE 9 MG/ML
1000 INJECTION INTRAMUSCULAR; INTRAVENOUS; SUBCUTANEOUS ONCE
Refills: 0 | Status: COMPLETED | OUTPATIENT
Start: 2022-06-20 | End: 2022-06-20

## 2022-06-20 RX ORDER — SODIUM CHLORIDE 9 MG/ML
1000 INJECTION INTRAMUSCULAR; INTRAVENOUS; SUBCUTANEOUS
Refills: 0 | Status: DISCONTINUED | OUTPATIENT
Start: 2022-06-20 | End: 2022-06-22

## 2022-06-20 RX ORDER — GABAPENTIN 400 MG/1
1 CAPSULE ORAL
Qty: 0 | Refills: 0 | DISCHARGE

## 2022-06-20 RX ORDER — METRONIDAZOLE 500 MG
500 TABLET ORAL EVERY 8 HOURS
Refills: 0 | Status: DISCONTINUED | OUTPATIENT
Start: 2022-06-20 | End: 2022-06-21

## 2022-06-20 RX ORDER — AMIODARONE HYDROCHLORIDE 400 MG/1
1 TABLET ORAL
Qty: 0 | Refills: 0 | DISCHARGE

## 2022-06-20 RX ORDER — METOPROLOL TARTRATE 50 MG
1 TABLET ORAL
Qty: 0 | Refills: 0 | DISCHARGE

## 2022-06-20 RX ORDER — BACLOFEN 100 %
1 POWDER (GRAM) MISCELLANEOUS
Qty: 0 | Refills: 0 | DISCHARGE

## 2022-06-20 RX ORDER — ASCORBIC ACID 60 MG
1 TABLET,CHEWABLE ORAL
Qty: 0 | Refills: 0 | DISCHARGE

## 2022-06-20 RX ORDER — AMIODARONE HYDROCHLORIDE 400 MG/1
100 TABLET ORAL DAILY
Refills: 0 | Status: DISCONTINUED | OUTPATIENT
Start: 2022-06-20 | End: 2022-06-27

## 2022-06-20 RX ORDER — APIXABAN 2.5 MG/1
5 TABLET, FILM COATED ORAL EVERY 12 HOURS
Refills: 0 | Status: DISCONTINUED | OUTPATIENT
Start: 2022-06-20 | End: 2022-06-23

## 2022-06-20 RX ORDER — ASCORBIC ACID 60 MG
500 TABLET,CHEWABLE ORAL DAILY
Refills: 0 | Status: DISCONTINUED | OUTPATIENT
Start: 2022-06-20 | End: 2022-06-27

## 2022-06-20 RX ORDER — AMIODARONE HYDROCHLORIDE 400 MG/1
2 TABLET ORAL
Qty: 0 | Refills: 0 | DISCHARGE

## 2022-06-20 RX ADMIN — APIXABAN 5 MILLIGRAM(S): 2.5 TABLET, FILM COATED ORAL at 18:26

## 2022-06-20 RX ADMIN — MORPHINE SULFATE 2 MILLIGRAM(S): 50 CAPSULE, EXTENDED RELEASE ORAL at 07:40

## 2022-06-20 RX ADMIN — SODIUM CHLORIDE 80 MILLILITER(S): 9 INJECTION INTRAMUSCULAR; INTRAVENOUS; SUBCUTANEOUS at 12:20

## 2022-06-20 RX ADMIN — Medication 2000 UNIT(S): at 14:15

## 2022-06-20 RX ADMIN — Medication 100 MILLIGRAM(S): at 07:41

## 2022-06-20 RX ADMIN — GABAPENTIN 100 MILLIGRAM(S): 400 CAPSULE ORAL at 13:39

## 2022-06-20 RX ADMIN — MORPHINE SULFATE 2 MILLIGRAM(S): 50 CAPSULE, EXTENDED RELEASE ORAL at 08:07

## 2022-06-20 RX ADMIN — GABAPENTIN 100 MILLIGRAM(S): 400 CAPSULE ORAL at 22:22

## 2022-06-20 RX ADMIN — Medication 500 MILLIGRAM(S): at 13:38

## 2022-06-20 RX ADMIN — Medication 200 MILLIGRAM(S): at 08:47

## 2022-06-20 RX ADMIN — SODIUM CHLORIDE 1000 MILLILITER(S): 9 INJECTION INTRAMUSCULAR; INTRAVENOUS; SUBCUTANEOUS at 03:36

## 2022-06-20 RX ADMIN — Medication 500 MILLIGRAM(S): at 22:22

## 2022-06-20 RX ADMIN — Medication 10 MILLIGRAM(S): at 13:39

## 2022-06-20 RX ADMIN — Medication 10 MILLIGRAM(S): at 22:22

## 2022-06-20 RX ADMIN — Medication 200 MILLIGRAM(S): at 18:26

## 2022-06-20 RX ADMIN — Medication 500 MILLIGRAM(S): at 12:19

## 2022-06-20 NOTE — ED ADULT TRIAGE NOTE - NS ED TRIAGE AVPU SCALE
Informed Dad that the name of the medicine is azithromycin.  Dad verbalized understanding and thanks.   Alert-The patient is alert, awake and responds to voice. The patient is oriented to time, place, and person. The triage nurse is able to obtain subjective information.

## 2022-06-20 NOTE — ED PROVIDER NOTE - CLINICAL SUMMARY MEDICAL DECISION MAKING FREE TEXT BOX
63yo F with hx CVA with hemiplegia/aphasia/dysphagia, HTN, Afib on Eliquis, HLD, presents with abdominal pain.  labs and UA unremarkable  CT A/P shows Concentric wall thickening of distal ascending colon extending to the hepatic flexure without significant pericolonic stranding. Consider nonemergent correlation with colonoscopy to exclude underlying neoplasm. No bowel obstruction.Irregularity of the sacrococcygeal tip containing sclerosis with adjacent soft tissue thickening. This may be sequela of prior injury or chronic osteomyelitis. Correlate with prior studies and consider MR if this concern for active osteomyelitis.Additional findings as mentioned above.  On reeval patient has persistent abdominal ttp. Given cipro/flagyl, morphine for pain/  discussed above with Dr. Mcmullen who agrees with plan for admission.

## 2022-06-20 NOTE — ED PROVIDER NOTE - OBJECTIVE STATEMENT
61yo F with hyx CVA with hemiplegia/aphasia/dysphagia, HTN, Afib on Eliquis, HLD, presents with abdominal pain. Reports pain all over abdomen. Denies fever, vomiting, diarrhea. Reports last BM yesterday.

## 2022-06-20 NOTE — ED ADULT NURSE NOTE - NS ED NURSE REPORT GIVEN DT
Attempted again, had success.    Patient scheduled.   
Called patient on 11/29, was unable to reach him due to his voicemail not being set up. Attempted again today 11/30 and same thing was unable to to get through or LVM because VM not set up.   
I faxed a work status note and the most recent office visit to Lester at [phone] and received a confirmed fax. I placed the completed documents in the bin to be scanned to the patients chart.  
Ok follow-up in 4-6 weeks  
Please advise: I spoke with Yvonne the . She states since this is a work comp situation the patient needs to follow up in at least 4-6 weeks for re-evaluation to determine if he can return to full work. 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 He is a  for his job.
Please call patient to schedule a follow up for 4-6 weeks from last office visit. Let the patient know due to Work Comp he has to follow up with Dr. Dean in 4-6 weeks to establish new return to work status.
Yvonne nurse  called requesting call back, she has a questions regarding work status letter she received.     Please advise: [phone]  
20-Jun-2022 10:34

## 2022-06-20 NOTE — H&P ADULT - PROBLEM SELECTOR PLAN 1
B12 levels are good.  We can hold off on B12 injections or pill   ----- Message from Evelyn Espinoza MD sent at 2/19/2019 12:10 PM EST -----  Cholesterol is high increase Lipitor 20 mg daily    
Explained results to patient. Patient voiced understanding.   
- Patient presented with LLQ abdominal pain then spread to all over abdomen   - No change in bowel movement   - CT abdomen and pelvis IV contrast was ordered   - Likely colitis   - Will start empirically Cipro and Flagyl   - NPO except medications   - IV fluids 0.9% NS 80cc/hr   - Pain meds ( Tylenol / Oxycodone if Moderate / Severe Pain )  - f/u CT abdomen and Pelvis

## 2022-06-20 NOTE — H&P ADULT - NSHPPHYSICALEXAM_GEN_ALL_CORE
ICU Vital Signs Last 24 Hrs  T(C): 36.9 (20 Jun 2022 07:00), Max: 36.9 (20 Jun 2022 07:00)  T(F): 98.4 (20 Jun 2022 07:00), Max: 98.4 (20 Jun 2022 07:00)  HR: 58 (20 Jun 2022 07:00) (58 - 58)  BP: 100/60 (20 Jun 2022 07:00) (100/60 - 111/71)  RR: 20 (20 Jun 2022 07:00) (16 - 20)  SpO2: 99% (20 Jun 2022 07:00) (97% - 99%)    GENERAL: NAD, lying in bed, in moderate abdominal pain   HEAD:  Atraumatic, Normocephalic  EYES: EOMI, PERRLA, conjunctiva and sclera clear  ENT: Moist mucous membranes  NECK: Supple, No JVD  CHEST/LUNG: Clear to auscultation bilaterally; No rales, rhonchi, wheezing, or rubs. Unlabored respirations  HEART: Regular rate and rhythm; No murmurs, rubs, or gallops  ABDOMEN: Bowel sounds present; Soft, Nontender, Nondistended. No hepatomegally  EXTREMITIES:  right side hempelgia, 2+ Peripheral Pulses, brisk capillary refill. No clubbing, cyanosis, or edema  NERVOUS SYSTEM:  Alert & Oriented X3, speech clear. No deficits   MSK: right side hemiplegia   SKIN: No rashes or lesions

## 2022-06-20 NOTE — H&P ADULT - ASSESSMENT
Patient is 62 years old female from [facility] with past medical history CVA with hemiplegia/aphasia/dysphagia, HTN, Afib on Eliquis, HLD, presents with abdominal pain. Patient states that pain started 4 days ago. it started on LLQ then spread to all her abdomen. Patient denied any change in bowel movement. Her last bowel movement was last night and it was normal. CT abdomen and Pelvis IV was ordered. Will admit the patient for suspected colitis.

## 2022-06-20 NOTE — ED PROVIDER NOTE - NSICDXPASTMEDICALHX_GEN_ALL_CORE_FT
PAST MEDICAL HISTORY:  Atrial fibrillation Diagnosed in 2014. controlled, managed with aspirin and beta blocker    CVA (cerebrovascular accident)     Thoracic aortic aneurysm 4 cm noted on CT scan on 3/2019

## 2022-06-20 NOTE — ED ADULT NURSE NOTE - NS ED NURSE PRESS ULCER STAGE 11
R knee OA.  CT shows: moderate to severe medial compartment osteoarthrosis of the knee with moderate lateral and patellofemoral arthrosis.  Now s/p R TKA with [facility] on 4/19.  - c/w wound and post-op care per ortho team  - pain control and bowel regimen  - OOB, incentive spirometry, PT R knee OA.  CT shows: moderate to severe medial compartment osteoarthrosis of the knee with moderate lateral and patellofemoral arthrosis.  Now s/p R TKA with [facility] on 4/19.  - c/w wound and post-op care per ortho team  - pain control and bowel regimen  - OOB, incentive spirometry (educated on use)  - c/w PT unstageable

## 2022-06-21 DIAGNOSIS — D49.0 NEOPLASM OF UNSPECIFIED BEHAVIOR OF DIGESTIVE SYSTEM: ICD-10-CM

## 2022-06-21 DIAGNOSIS — Z02.9 ENCOUNTER FOR ADMINISTRATIVE EXAMINATIONS, UNSPECIFIED: ICD-10-CM

## 2022-06-21 DIAGNOSIS — M46.28 OSTEOMYELITIS OF VERTEBRA, SACRAL AND SACROCOCCYGEAL REGION: ICD-10-CM

## 2022-06-21 DIAGNOSIS — Z86.73 PERSONAL HISTORY OF TRANSIENT ISCHEMIC ATTACK (TIA), AND CEREBRAL INFARCTION WITHOUT RESIDUAL DEFICITS: ICD-10-CM

## 2022-06-21 LAB
ALBUMIN SERPL ELPH-MCNC: 3.2 G/DL — LOW (ref 3.5–5)
ALP SERPL-CCNC: 108 U/L — SIGNIFICANT CHANGE UP (ref 40–120)
ALT FLD-CCNC: 18 U/L DA — SIGNIFICANT CHANGE UP (ref 10–60)
ANION GAP SERPL CALC-SCNC: 7 MMOL/L — SIGNIFICANT CHANGE UP (ref 5–17)
AST SERPL-CCNC: 10 U/L — SIGNIFICANT CHANGE UP (ref 10–40)
BASOPHILS # BLD AUTO: 0.06 K/UL — SIGNIFICANT CHANGE UP (ref 0–0.2)
BASOPHILS NFR BLD AUTO: 1.4 % — SIGNIFICANT CHANGE UP (ref 0–2)
BILIRUB SERPL-MCNC: 0.9 MG/DL — SIGNIFICANT CHANGE UP (ref 0.2–1.2)
BUN SERPL-MCNC: 8 MG/DL — SIGNIFICANT CHANGE UP (ref 7–18)
CALCIUM SERPL-MCNC: 9.2 MG/DL — SIGNIFICANT CHANGE UP (ref 8.4–10.5)
CHLORIDE SERPL-SCNC: 108 MMOL/L — SIGNIFICANT CHANGE UP (ref 96–108)
CO2 SERPL-SCNC: 27 MMOL/L — SIGNIFICANT CHANGE UP (ref 22–31)
CREAT SERPL-MCNC: 0.68 MG/DL — SIGNIFICANT CHANGE UP (ref 0.5–1.3)
CULTURE RESULTS: SIGNIFICANT CHANGE UP
EGFR: 100 ML/MIN/1.73M2 — SIGNIFICANT CHANGE UP
EOSINOPHIL # BLD AUTO: 0.04 K/UL — SIGNIFICANT CHANGE UP (ref 0–0.5)
EOSINOPHIL NFR BLD AUTO: 1 % — SIGNIFICANT CHANGE UP (ref 0–6)
GLUCOSE SERPL-MCNC: 85 MG/DL — SIGNIFICANT CHANGE UP (ref 70–99)
HCT VFR BLD CALC: 40.1 % — SIGNIFICANT CHANGE UP (ref 34.5–45)
HCV AB S/CO SERPL IA: 0.18 S/CO — SIGNIFICANT CHANGE UP (ref 0–0.99)
HCV AB SERPL-IMP: SIGNIFICANT CHANGE UP
HGB BLD-MCNC: 13.2 G/DL — SIGNIFICANT CHANGE UP (ref 11.5–15.5)
IMM GRANULOCYTES NFR BLD AUTO: 0.2 % — SIGNIFICANT CHANGE UP (ref 0–1.5)
LYMPHOCYTES # BLD AUTO: 1.1 K/UL — SIGNIFICANT CHANGE UP (ref 1–3.3)
LYMPHOCYTES # BLD AUTO: 26.2 % — SIGNIFICANT CHANGE UP (ref 13–44)
MAGNESIUM SERPL-MCNC: 2.1 MG/DL — SIGNIFICANT CHANGE UP (ref 1.6–2.6)
MCHC RBC-ENTMCNC: 31.8 PG — SIGNIFICANT CHANGE UP (ref 27–34)
MCHC RBC-ENTMCNC: 32.9 GM/DL — SIGNIFICANT CHANGE UP (ref 32–36)
MCV RBC AUTO: 96.6 FL — SIGNIFICANT CHANGE UP (ref 80–100)
MONOCYTES # BLD AUTO: 0.5 K/UL — SIGNIFICANT CHANGE UP (ref 0–0.9)
MONOCYTES NFR BLD AUTO: 11.9 % — SIGNIFICANT CHANGE UP (ref 2–14)
MRSA PCR RESULT.: SIGNIFICANT CHANGE UP
NEUTROPHILS # BLD AUTO: 2.49 K/UL — SIGNIFICANT CHANGE UP (ref 1.8–7.4)
NEUTROPHILS NFR BLD AUTO: 59.3 % — SIGNIFICANT CHANGE UP (ref 43–77)
NRBC # BLD: 0 /100 WBCS — SIGNIFICANT CHANGE UP (ref 0–0)
PHOSPHATE SERPL-MCNC: 3.7 MG/DL — SIGNIFICANT CHANGE UP (ref 2.5–4.5)
PLATELET # BLD AUTO: 169 K/UL — SIGNIFICANT CHANGE UP (ref 150–400)
POTASSIUM SERPL-MCNC: 3.5 MMOL/L — SIGNIFICANT CHANGE UP (ref 3.5–5.3)
POTASSIUM SERPL-SCNC: 3.5 MMOL/L — SIGNIFICANT CHANGE UP (ref 3.5–5.3)
PROT SERPL-MCNC: 7.5 G/DL — SIGNIFICANT CHANGE UP (ref 6–8.3)
RBC # BLD: 4.15 M/UL — SIGNIFICANT CHANGE UP (ref 3.8–5.2)
RBC # FLD: 13.3 % — SIGNIFICANT CHANGE UP (ref 10.3–14.5)
S AUREUS DNA NOSE QL NAA+PROBE: SIGNIFICANT CHANGE UP
SODIUM SERPL-SCNC: 142 MMOL/L — SIGNIFICANT CHANGE UP (ref 135–145)
SPECIMEN SOURCE: SIGNIFICANT CHANGE UP
WBC # BLD: 4.2 K/UL — SIGNIFICANT CHANGE UP (ref 3.8–10.5)
WBC # FLD AUTO: 4.2 K/UL — SIGNIFICANT CHANGE UP (ref 3.8–10.5)

## 2022-06-21 RX ORDER — SIMVASTATIN 20 MG/1
10 TABLET, FILM COATED ORAL AT BEDTIME
Refills: 0 | Status: DISCONTINUED | OUTPATIENT
Start: 2022-06-21 | End: 2022-06-27

## 2022-06-21 RX ORDER — INFLUENZA VIRUS VACCINE 15; 15; 15; 15 UG/.5ML; UG/.5ML; UG/.5ML; UG/.5ML
0.5 SUSPENSION INTRAMUSCULAR ONCE
Refills: 0 | Status: DISCONTINUED | OUTPATIENT
Start: 2022-06-21 | End: 2022-06-27

## 2022-06-21 RX ADMIN — Medication 10 MILLIGRAM(S): at 14:09

## 2022-06-21 RX ADMIN — Medication 10 MILLIGRAM(S): at 06:51

## 2022-06-21 RX ADMIN — Medication 500 MILLIGRAM(S): at 14:10

## 2022-06-21 RX ADMIN — GABAPENTIN 100 MILLIGRAM(S): 400 CAPSULE ORAL at 14:09

## 2022-06-21 RX ADMIN — Medication 500 MILLIGRAM(S): at 06:51

## 2022-06-21 RX ADMIN — APIXABAN 5 MILLIGRAM(S): 2.5 TABLET, FILM COATED ORAL at 17:39

## 2022-06-21 RX ADMIN — Medication 500 MILLIGRAM(S): at 12:30

## 2022-06-21 RX ADMIN — SIMVASTATIN 10 MILLIGRAM(S): 20 TABLET, FILM COATED ORAL at 23:33

## 2022-06-21 RX ADMIN — Medication 25 MILLIGRAM(S): at 06:51

## 2022-06-21 RX ADMIN — SODIUM CHLORIDE 80 MILLILITER(S): 9 INJECTION INTRAMUSCULAR; INTRAVENOUS; SUBCUTANEOUS at 12:40

## 2022-06-21 RX ADMIN — GABAPENTIN 100 MILLIGRAM(S): 400 CAPSULE ORAL at 06:51

## 2022-06-21 RX ADMIN — APIXABAN 5 MILLIGRAM(S): 2.5 TABLET, FILM COATED ORAL at 06:51

## 2022-06-21 RX ADMIN — GABAPENTIN 100 MILLIGRAM(S): 400 CAPSULE ORAL at 23:32

## 2022-06-21 RX ADMIN — Medication 2000 UNIT(S): at 12:30

## 2022-06-21 RX ADMIN — Medication 10 MILLIGRAM(S): at 23:33

## 2022-06-21 RX ADMIN — SODIUM CHLORIDE 80 MILLILITER(S): 9 INJECTION INTRAMUSCULAR; INTRAVENOUS; SUBCUTANEOUS at 06:51

## 2022-06-21 RX ADMIN — Medication 200 MILLIGRAM(S): at 06:51

## 2022-06-21 NOTE — CONSULT NOTE ADULT - SUBJECTIVE AND OBJECTIVE BOX
HPI:  Patient is 62 years old female from [facility] with past medical history CVA with hemiplegia/aphasia/dysphagia, HTN, Afib on Eliquis, HLD, presents with abdominal pain. Patient states that pain started 4 days ago. it started on LLQ then spread to all her abdomen. Patient denied any change in bowel movement. Her last bowel movement was last night and it was normal. Patient denied any bleeding in stool, chest pain, breathing problem, Nausea or vomiting. (2022 10:30)      PAST MEDICAL & SURGICAL HISTORY:  Atrial fibrillation  Diagnosed in . controlled, managed with aspirin and beta blocker      Thoracic aortic aneurysm  4 cm noted on CT scan on 3/2019      CVA (cerebrovascular accident)      H/O tubal ligation        H/O hammer toe correction            No Known Allergies  sugar (Rash)      Meds:  acetaminophen     Tablet .. 650 milliGRAM(s) Oral every 6 hours PRN  aMIOdarone    Tablet 100 milliGRAM(s) Oral daily  apixaban 5 milliGRAM(s) Oral every 12 hours  ascorbic acid 500 milliGRAM(s) Oral daily  baclofen 10 milliGRAM(s) Oral every 8 hours  cholecalciferol 2000 Unit(s) Oral daily  ciprofloxacin   IVPB 400 milliGRAM(s) IV Intermittent every 12 hours  gabapentin 100 milliGRAM(s) Oral three times a day  influenza   Vaccine 0.5 milliLiter(s) IntraMuscular once  metoprolol succinate ER 25 milliGRAM(s) Oral daily  metroNIDAZOLE    Tablet 500 milliGRAM(s) Oral every 8 hours  simvastatin 10 milliGRAM(s) Oral at bedtime  sodium chloride 0.9%. 1000 milliLiter(s) IV Continuous <Continuous>      SOCIAL HISTORY:  Smoker:  YES / NO        PACK YEARS:                         WHEN QUIT?  ETOH use:  YES / NO               FREQUENCY / QUANTITY:  Ilicit Drug use:  YES / NO  Occupation:  Assisted device use (Cane / Walker):  Live with:    FAMILY HISTORY:  FH: heart disease    FH: type 2 diabetes        VITALS:  Vital Signs Last 24 Hrs  T(C): 36.4 (2022 13:03), Max: 36.7 (2022 19:52)  T(F): 97.5 (2022 13:03), Max: 98.1 (2022 00:01)  HR: 77 (2022 13:03) (46 - 88)  BP: 115/81 (2022 13:03) (104/59 - 128/63)  BP(mean): --  RR: 18 (2022 13:03) (18 - 18)  SpO2: 99% (2022 13:03) (97% - 100%)    LABS/DIAGNOSTIC TESTS:                          13.2   4.20  )-----------( 169      ( 2022 09:10 )             40.1     WBC Count: 4.20 K/uL ( @ 09:10)  WBC Count: 3.94 K/uL ( @ 03:39)          142  |  108  |  8   ----------------------------<  85  3.5   |  27  |  0.68    Ca    9.2      2022 09:10  Phos  3.7       Mg     2.1         TPro  7.5  /  Alb  3.2<L>  /  TBili  0.9  /  DBili  x   /  AST  10  /  ALT  18  /  AlkPhos  108        Urinalysis Basic - ( 2022 05:33 )    Color: Yellow / Appearance: Clear / S.010 / pH: x  Gluc: x / Ketone: Negative  / Bili: Negative / Urobili: Negative   Blood: x / Protein: Negative / Nitrite: Negative   Leuk Esterase: Negative / RBC: 2-5 /HPF / WBC 0-2 /HPF   Sq Epi: x / Non Sq Epi: Few /HPF / Bacteria: Negative /HPF        LIVER FUNCTIONS - ( 2022 09:10 )  Alb: 3.2 g/dL / Pro: 7.5 g/dL / ALK PHOS: 108 U/L / ALT: 18 U/L DA / AST: 10 U/L / GGT: x                 LACTATE:    ABG -     CULTURES:   Clean Catch Clean Catch (Midstream)   @ 10:24   >=3 organisms. Probable collection contamination.  --  --          RADIOLOGY:< from: CT Abdomen and Pelvis w/ IV Cont (22 @ 06:13) >  ACC: [id number] EXAM:  CT ABDOMEN AND PELVIS IC                          PROCEDURE DATE:  2022          INTERPRETATION:  CLINICAL INFORMATION: Abdominal pain.    COMPARISON: None.    CONTRAST/COMPLICATIONS:  IV Contrast: Omnipaque 350  90 cc administered  Oral Contrast: NONE  Complications: None reported at time of study completion    PROCEDURE:  CT of the Abdomen and Pelvis was performed.  Sagittal and coronal reformats were performed.    FINDINGS:  LOWER CHEST: Mild bibasilar dependent atelectasis. Partially imaged right   lower lobe nodule measuring up to 1.1 cm (3:2) for which nonemergent   pulmonary imaging follow-up is advised. Cardiomegaly. Small pericardial   effusion and/or thickening.    LIVER: Enlarged fatty liver measuring 23 cm in length.  BILE DUCTS: Normal caliber.  GALLBLADDER: Within normal limits.  SPLEEN: Within normal limits.  PANCREAS: Within normal limits.  ADRENALS: Within normal limits.  KIDNEYS/URETERS: No hydronephrosis. Left renal cyst.    BLADDER: Within normal limits.  REPRODUCTIVE ORGANS: Uterus and adnexa within normal limits.    BOWEL: There is concentric wall thickening of distal ascending colon   extending to the hepatic flexure without significant pericolonic   stranding. Consider nonemergent correlation with colonoscopy to exclude   underlying neoplasm. No bowel obstruction. Normal appendix. Abundant   fecal material throughout the colonic loops.  PERITONEUM: No ascites.  VESSELS: Atherosclerotic changes. Infrarenal IVC filter identified.  RETROPERITONEUM/LYMPH NODES: No lymphadenopathy.  ABDOMINAL WALL: Within normal limits.  BONES: Degenerative changes. Irregularity of the sacrococcygeal tip   containing sclerosis with adjacent soft tissue thickening. This may be   sequela of prior injury or chronic osteomyelitis. Correlate with prior   studies and consider MR if this concern for active osteomyelitis.   Osteoarthritis of bilateral hip joints with large bridging hypertrophic   osteophytosis identified at the level of the right hipjoint extending to   the iliac bone.    IMPRESSION:    Concentric wall thickening of distal ascending colon extending to the   hepatic flexure without significant pericolonic stranding. Consider   nonemergent correlation with colonoscopy to exclude underlying neoplasm.   No bowel obstruction.    Irregularity of the sacrococcygeal tip containing sclerosis with adjacent   soft tissue thickening. This may be sequela of prior injury or chronic   osteomyelitis. Correlate with prior studies and consider MR if this   concern for active osteomyelitis.    Additional findings as mentioned above.              --- End of Report ---            ERMIAS ROUSE MD; Attending Radiologist  This document has been electronically signed. 2022  6:25AM    < end of copied text >        ROS  [  ] UNABLE TO ELICIT               HPI:  Patient is 62 years old female from [facility] with past medical history CVA with hemiplegia/aphasia/dysphagia, HTN, Afib on Eliquis, HLD, presents with abdominal pain. Patient states that pain started 4 days ago. it started on LLQ then spread to all her abdomen. Patient denied any change in bowel movement. Her last bowel movement was last night and it was normal. Patient denied any bleeding in stool, chest pain, breathing problem, Nausea or vomiting. (2022 10:30)      History as above, asked to see this patient who is from a NH and has a H/O a CVA with right sided hemiparesis and is bedbound. She was admitted with abdominal pain and had a CT abdomen and found to have some thickening and inflammatory changes in her ascending colon as well as chronic Osteo of her sacrococcygeal region. She was placed on Cipro and Flagyl for possible Colitis but she has no fevers, chills or diarrhea, she still has abdominal pain but no nausea or vomiting.           PAST MEDICAL & SURGICAL HISTORY:  Atrial fibrillation  Diagnosed in . controlled, managed with aspirin and beta blocker      Thoracic aortic aneurysm  4 cm noted on CT scan on 3/2019      CVA (cerebrovascular accident)      H/O tubal ligation        H/O hammer toe correction            No Known Allergies  sugar (Rash)      Meds:  acetaminophen     Tablet .. 650 milliGRAM(s) Oral every 6 hours PRN  aMIOdarone    Tablet 100 milliGRAM(s) Oral daily  apixaban 5 milliGRAM(s) Oral every 12 hours  ascorbic acid 500 milliGRAM(s) Oral daily  baclofen 10 milliGRAM(s) Oral every 8 hours  cholecalciferol 2000 Unit(s) Oral daily  ciprofloxacin   IVPB 400 milliGRAM(s) IV Intermittent every 12 hours  gabapentin 100 milliGRAM(s) Oral three times a day  influenza   Vaccine 0.5 milliLiter(s) IntraMuscular once  metoprolol succinate ER 25 milliGRAM(s) Oral daily  metroNIDAZOLE    Tablet 500 milliGRAM(s) Oral every 8 hours  simvastatin 10 milliGRAM(s) Oral at bedtime  sodium chloride 0.9%. 1000 milliLiter(s) IV Continuous <Continuous>      SOCIAL HISTORY:  Smoker:  no  ETOH use:  no      FAMILY HISTORY:  FH: heart disease    FH: type 2 diabetes        VITALS:  Vital Signs Last 24 Hrs  T(C): 36.4 (2022 13:03), Max: 36.7 (2022 19:52)  T(F): 97.5 (2022 13:03), Max: 98.1 (2022 00:01)  HR: 77 (2022 13:03) (46 - 88)  BP: 115/81 (2022 13:03) (104/59 - 128/63)  BP(mean): --  RR: 18 (2022 13:03) (18 - 18)  SpO2: 99% (2022 13:03) (97% - 100%)    LABS/DIAGNOSTIC TESTS:                          13.2   4.20  )-----------( 169      ( 2022 09:10 )             40.1     WBC Count: 4.20 K/uL ( @ 09:10)  WBC Count: 3.94 K/uL ( @ 03:39)          142  |  108  |  8   ----------------------------<  85  3.5   |  27  |  0.68    Ca    9.2      2022 09:10  Phos  3.7       Mg     2.1         TPro  7.5  /  Alb  3.2<L>  /  TBili  0.9  /  DBili  x   /  AST  10  /  ALT  18  /  AlkPhos  108  -      Urinalysis Basic - ( 2022 05:33 )    Color: Yellow / Appearance: Clear / S.010 / pH: x  Gluc: x / Ketone: Negative  / Bili: Negative / Urobili: Negative   Blood: x / Protein: Negative / Nitrite: Negative   Leuk Esterase: Negative / RBC: 2-5 /HPF / WBC 0-2 /HPF   Sq Epi: x / Non Sq Epi: Few /HPF / Bacteria: Negative /HPF        LIVER FUNCTIONS - ( 2022 09:10 )  Alb: 3.2 g/dL / Pro: 7.5 g/dL / ALK PHOS: 108 U/L / ALT: 18 U/L DA / AST: 10 U/L / GGT: x                 LACTATE:    ABG -     CULTURES:   Clean Catch Clean Catch (Midstream)   @ 10:24   >=3 organisms. Probable collection contamination.  --  --          RADIOLOGY:< from: CT Abdomen and Pelvis w/ IV Cont (22 @ 06:13) >  ACC: [id number] EXAM:  CT ABDOMEN AND PELVIS IC                          PROCEDURE DATE:  2022          INTERPRETATION:  CLINICAL INFORMATION: Abdominal pain.    COMPARISON: None.    CONTRAST/COMPLICATIONS:  IV Contrast: Omnipaque 350  90 cc administered  Oral Contrast: NONE  Complications: None reported at time of study completion    PROCEDURE:  CT of the Abdomen and Pelvis was performed.  Sagittal and coronal reformats were performed.    FINDINGS:  LOWER CHEST: Mild bibasilar dependent atelectasis. Partially imaged right   lower lobe nodule measuring up to 1.1 cm (3:2) for which nonemergent   pulmonary imaging follow-up is advised. Cardiomegaly. Small pericardial   effusion and/or thickening.    LIVER: Enlarged fatty liver measuring 23 cm in length.  BILE DUCTS: Normal caliber.  GALLBLADDER: Within normal limits.  SPLEEN: Within normal limits.  PANCREAS: Within normal limits.  ADRENALS: Within normal limits.  KIDNEYS/URETERS: No hydronephrosis. Left renal cyst.    BLADDER: Within normal limits.  REPRODUCTIVE ORGANS: Uterus and adnexa within normal limits.    BOWEL: There is concentric wall thickening of distal ascending colon   extending to the hepatic flexure without significant pericolonic   stranding. Consider nonemergent correlation with colonoscopy to exclude   underlying neoplasm. No bowel obstruction. Normal appendix. Abundant   fecal material throughout the colonic loops.  PERITONEUM: No ascites.  VESSELS: Atherosclerotic changes. Infrarenal IVC filter identified.  RETROPERITONEUM/LYMPH NODES: No lymphadenopathy.  ABDOMINAL WALL: Within normal limits.  BONES: Degenerative changes. Irregularity of the sacrococcygeal tip   containing sclerosis with adjacent soft tissue thickening. This may be   sequela of prior injury or chronic osteomyelitis. Correlate with prior   studies and consider MR if this concern for active osteomyelitis.   Osteoarthritis of bilateral hip joints with large bridging hypertrophic   osteophytosis identified at the level of the right hipjoint extending to   the iliac bone.    IMPRESSION:    Concentric wall thickening of distal ascending colon extending to the   hepatic flexure without significant pericolonic stranding. Consider   nonemergent correlation with colonoscopy to exclude underlying neoplasm.   No bowel obstruction.    Irregularity of the sacrococcygeal tip containing sclerosis with adjacent   soft tissue thickening. This may be sequela of prior injury or chronic   osteomyelitis. Correlate with prior studies and consider MR if this   concern for active osteomyelitis.    Additional findings as mentioned above.              --- End of Report ---            ERMIAS ROUSE MD; Attending Radiologist  This document has been electronically signed. 2022  6:25AM    < end of copied text >        ROS  [  ] UNABLE TO ELICIT

## 2022-06-21 NOTE — CONSULT NOTE ADULT - RESPIRATORY
clear to auscultation bilaterally/no wheezes/no rales/no rhonchi
clear to auscultation bilaterally/no wheezes/no rales/no rhonchi/no respiratory distress/no use of accessory muscles/no subcutaneous emphysema/airway patent

## 2022-06-21 NOTE — PATIENT PROFILE ADULT - FALL HARM RISK - HARM RISK INTERVENTIONS

## 2022-06-21 NOTE — PATIENT PROFILE ADULT - STATED REASON FOR ADMISSION
Abdomen pain Island Pedicle Flap With Canthal Suspension Text: The defect edges were debeveled with a #15 scalpel blade.  Given the location of the defect, shape of the defect and the proximity to free margins an island pedicle advancement flap was deemed most appropriate.  Using a sterile surgical marker, an appropriate advancement flap was drawn incorporating the defect, outlining the appropriate donor tissue and placing the expected incisions within the relaxed skin tension lines where possible. The area thus outlined was incised deep to adipose tissue with a #15 scalpel blade.  The skin margins were undermined to an appropriate distance in all directions around the primary defect and laterally outward around the island pedicle utilizing iris scissors.  There was minimal undermining beneath the pedicle flap. A suspension suture was placed in the canthal tendon to prevent tension and prevent ectropion.

## 2022-06-21 NOTE — CONSULT NOTE ADULT - ASSESSMENT
1. Abdominal pain  2. Wall thickening of distal ascending colon  3. R/o colon cancer  4. Fecal impaction     Suggestions:    1. Colonoscopy  2. Daily stool softener  3. Avoid NSAID  4. Protonix daily  5. Colonoscopy  6. DVT prophylaxis
Chronic Osteomyelitis of Sacrum/ Coccyx region  No Evidence of infectious Colitis at this time      Plan - Given Osteo is Chronic there is no need to treat it  DC Cipro and Flagyl for now  will monitor off antibiotics and see if any signs of infection develop.

## 2022-06-21 NOTE — SWALLOW BEDSIDE ASSESSMENT ADULT - SLP PERTINENT HISTORY OF CURRENT PROBLEM
Patient is 62 years old female from [facility] with past medical history CVA with hemiplegia/aphasia/dysphagia, HTN, Afib on Eliquis, HLD, presents with abdominal pain. Patient states that pain started 4 days PTA. it started on LLQ then spread to all her abdomen. Patient denied any change in bowel movement. Her last bowel movement was last night and it was normal. Patient denied any bleeding in stool, chest pain, breathing problem, Nausea or vomiting.

## 2022-06-21 NOTE — PROGRESS NOTE ADULT - SUBJECTIVE AND OBJECTIVE BOX
Patient is a 62y old  Female who presents with a chief complaint of Abdominal pain (2022 10:30)    OVERNIGHT EVENTS: no acute changes    REVIEW OF SYSTEMS:  CONSTITUTIONAL: No fever, chills  ENMT:  No difficulty hearing, no change in vision  NECK: No pain or stiffness  RESPIRATORY: No cough, SOB  CARDIOVASCULAR: No chest pain, palpitations  GASTROINTESTINAL: No abdominal pain. No nausea, vomiting, or diarrhea  GENITOURINARY: No dysuria  NEUROLOGICAL: No HA  SKIN: No itching, burning, rashes, or lesions   LYMPH NODES: No enlarged glands  ENDOCRINE: No heat or cold intolerance; No hair loss  MUSCULOSKELETAL: No joint pain or swelling; No muscle, back, or extremity pain  PSYCHIATRIC: No depression, anxiety  HEME/LYMPH: No easy bruising, or bleeding gums    T(C): 36.2 (22 @ 05:19), Max: 36.9 (22 @ 11:30)  HR: 52 (22 @ 05:19) (46 - 88)  BP: 104/59 (22 @ 05:19) (104/59 - 128/63)  RR: 18 (22 @ 05:19) (18 - 19)  SpO2: 100% (22 @ 05:19) (97% - 100%)  Wt(kg): --Vital Signs Last 24 Hrs  T(C): 36.2 (2022 05:19), Max: 36.9 (2022 11:30)  T(F): 97.1 (2022 05:19), Max: 98.4 (2022 11:30)  HR: 52 (2022 05:19) (46 - 88)  BP: 104/59 (2022 05:19) (104/59 - 128/63)  BP(mean): --  RR: 18 (2022 05:19) (18 - 19)  SpO2: 100% (2022 05:19) (97% - 100%)    MEDICATIONS  (STANDING):  aMIOdarone    Tablet 100 milliGRAM(s) Oral daily  apixaban 5 milliGRAM(s) Oral every 12 hours  ascorbic acid 500 milliGRAM(s) Oral daily  baclofen 10 milliGRAM(s) Oral every 8 hours  cholecalciferol 2000 Unit(s) Oral daily  ciprofloxacin   IVPB 400 milliGRAM(s) IV Intermittent every 12 hours  gabapentin 100 milliGRAM(s) Oral three times a day  influenza   Vaccine 0.5 milliLiter(s) IntraMuscular once  metoprolol succinate ER 25 milliGRAM(s) Oral daily  metroNIDAZOLE    Tablet 500 milliGRAM(s) Oral every 8 hours  sodium chloride 0.9%. 1000 milliLiter(s) (80 mL/Hr) IV Continuous <Continuous>    MEDICATIONS  (PRN):  acetaminophen     Tablet .. 650 milliGRAM(s) Oral every 6 hours PRN Mild Pain (1 - 3)      PHYSICAL EXAM:  GENERAL: well-appearing, NAD  EYES: clear conjunctiva; EOMI  ENMT: +right sided facial droop. Moist mucous membranes  NECK: Supple, No JVD, Normal thyroid  CHEST/LUNG: Clear to auscultation bilaterally; No rales, rhonchi, wheezing, or rubs  HEART: S1, S2, Regular rate and rhythm  ABDOMEN: Soft, Nontender, Nondistended; Bowel sounds present  NEURO: +aphasia. +slurred speech. Alert & Oriented X3  EXTREMITIES: +right sided hemiparesis. No LE edema, no calf tenderness  LYMPH: No lymphadenopathy noted  SKIN: No rashes or lesions    Consultant(s) Notes Reviewed:  [x ] YES  [ ] NO  Care Discussed with Consultants/Other Providers [ x] YES  [ ] NO    LABS:                        13.2   4.20  )-----------( 169      ( 2022 09:10 )             40.1     06-    142  |  108  |  8   ----------------------------<  85  3.5   |  27  |  0.68    Ca    9.2      2022 09:10  Phos  3.7     06-  Mg     2.1     -    TPro  7.5  /  Alb  3.2<L>  /  TBili  0.9  /  DBili  x   /  AST  10  /  ALT  18  /  AlkPhos  108  -      CAPILLARY BLOOD GLUCOSE            Urinalysis Basic - ( 2022 05:33 )    Color: Yellow / Appearance: Clear / S.010 / pH: x  Gluc: x / Ketone: Negative  / Bili: Negative / Urobili: Negative   Blood: x / Protein: Negative / Nitrite: Negative   Leuk Esterase: Negative / RBC: 2-5 /HPF / WBC 0-2 /HPF   Sq Epi: x / Non Sq Epi: Few /HPF / Bacteria: Negative /HPF        RADIOLOGY & ADDITIONAL TESTS:      Imaging Personally Reviewed:  [ ] YES  [ ] NO   Patient is a 62y old  Female who presents with a chief complaint of Abdominal pain (2022 10:30)    OVERNIGHT EVENTS: no acute changes    REVIEW OF SYSTEMS:  CONSTITUTIONAL: No fever, chills  ENMT:  No difficulty hearing, no change in vision  NECK: No pain or stiffness  RESPIRATORY: No cough, SOB  CARDIOVASCULAR: No chest pain, palpitations  GASTROINTESTINAL: No abdominal pain. No nausea, vomiting, or diarrhea  GENITOURINARY: No dysuria  NEUROLOGICAL: No HA  SKIN: No itching, burning, rashes, or lesions   LYMPH NODES: No enlarged glands  ENDOCRINE: No heat or cold intolerance; No hair loss  MUSCULOSKELETAL: No joint pain or swelling; No muscle, back, or extremity pain  PSYCHIATRIC: No depression, anxiety  HEME/LYMPH: No easy bruising, or bleeding gums    T(C): 36.2 (22 @ 05:19), Max: 36.9 (22 @ 11:30)  HR: 52 (22 @ 05:19) (46 - 88)  BP: 104/59 (22 @ 05:19) (104/59 - 128/63)  RR: 18 (22 @ 05:19) (18 - 19)  SpO2: 100% (22 @ 05:19) (97% - 100%)  Wt(kg): --Vital Signs Last 24 Hrs  T(C): 36.2 (2022 05:19), Max: 36.9 (2022 11:30)  T(F): 97.1 (2022 05:19), Max: 98.4 (2022 11:30)  HR: 52 (2022 05:19) (46 - 88)  BP: 104/59 (2022 05:19) (104/59 - 128/63)  BP(mean): --  RR: 18 (2022 05:19) (18 - 19)  SpO2: 100% (2022 05:19) (97% - 100%)    MEDICATIONS  (STANDING):  aMIOdarone    Tablet 100 milliGRAM(s) Oral daily  apixaban 5 milliGRAM(s) Oral every 12 hours  ascorbic acid 500 milliGRAM(s) Oral daily  baclofen 10 milliGRAM(s) Oral every 8 hours  cholecalciferol 2000 Unit(s) Oral daily  ciprofloxacin   IVPB 400 milliGRAM(s) IV Intermittent every 12 hours  gabapentin 100 milliGRAM(s) Oral three times a day  influenza   Vaccine 0.5 milliLiter(s) IntraMuscular once  metoprolol succinate ER 25 milliGRAM(s) Oral daily  metroNIDAZOLE    Tablet 500 milliGRAM(s) Oral every 8 hours  sodium chloride 0.9%. 1000 milliLiter(s) (80 mL/Hr) IV Continuous <Continuous>    MEDICATIONS  (PRN):  acetaminophen     Tablet .. 650 milliGRAM(s) Oral every 6 hours PRN Mild Pain (1 - 3)      PHYSICAL EXAM:  GENERAL: well-appearing, NAD  EYES: clear conjunctiva; EOMI  ENMT: +right sided facial droop. Moist mucous membranes  NECK: Supple, No JVD, Normal thyroid  CHEST/LUNG: Clear to auscultation bilaterally; No rales, rhonchi, wheezing, or rubs  HEART: S1, S2, Regular rate and rhythm  ABDOMEN: Soft, Nontender, Nondistended; Bowel sounds present  NEURO: +aphasia. +slurred speech. Alert & Oriented X3  EXTREMITIES: +right sided hemiparesis. No LE edema, no calf tenderness  LYMPH: No lymphadenopathy noted  SKIN: No rashes or lesions    Consultant(s) Notes Reviewed:  [x ] YES  [ ] NO  Care Discussed with Consultants/Other Providers [ x] YES  [ ] NO    LABS:                        13.2   4.20  )-----------( 169      ( 2022 09:10 )             40.1     06-    142  |  108  |  8   ----------------------------<  85  3.5   |  27  |  0.68    Ca    9.2      2022 09:10  Phos  3.7     06-  Mg     2.1     -    TPro  7.5  /  Alb  3.2<L>  /  TBili  0.9  /  DBili  x   /  AST  10  /  ALT  18  /  AlkPhos  108  -      CAPILLARY BLOOD GLUCOSE            Urinalysis Basic - ( 2022 05:33 )    Color: Yellow / Appearance: Clear / S.010 / pH: x  Gluc: x / Ketone: Negative  / Bili: Negative / Urobili: Negative   Blood: x / Protein: Negative / Nitrite: Negative   Leuk Esterase: Negative / RBC: 2-5 /HPF / WBC 0-2 /HPF   Sq Epi: x / Non Sq Epi: Few /HPF / Bacteria: Negative /HPF        RADIOLOGY & ADDITIONAL TESTS:  < from: CT Abdomen and Pelvis w/ IV Cont (22 @ 06:13) >    ACC: [id number] EXAM:  CT ABDOMEN AND PELVIS IC                          PROCEDURE DATE:  2022          INTERPRETATION:  CLINICAL INFORMATION: Abdominal pain.    COMPARISON: None.    CONTRAST/COMPLICATIONS:  IV Contrast: Omnipaque 350  90 cc administered  Oral Contrast: NONE  Complications: None reported at time of study completion    PROCEDURE:  CT of the Abdomen and Pelvis was performed.  Sagittal and coronal reformats were performed.    FINDINGS:  LOWER CHEST: Mild bibasilar dependent atelectasis. Partially imaged right   lower lobe nodule measuring up to 1.1 cm (3:2) for which nonemergent   pulmonary imaging follow-up is advised. Cardiomegaly. Small pericardial   effusion and/or thickening.    LIVER: Enlarged fatty liver measuring 23 cm in length.  BILE DUCTS: Normal caliber.  GALLBLADDER: Within normal limits.  SPLEEN: Within normal limits.  PANCREAS: Within normal limits.  ADRENALS: Within normal limits.  KIDNEYS/URETERS: No hydronephrosis. Left renal cyst.    BLADDER: Within normal limits.  REPRODUCTIVE ORGANS: Uterus and adnexa within normal limits.    BOWEL: There is concentric wall thickening of distal ascending colon   extending to the hepatic flexure without significant pericolonic   stranding. Consider nonemergent correlation with colonoscopy to exclude   underlying neoplasm. No bowel obstruction. Normal appendix. Abundant   fecal material throughout the colonic loops.  PERITONEUM: No ascites.  VESSELS: Atherosclerotic changes. Infrarenal IVC filter identified.  RETROPERITONEUM/LYMPH NODES: No lymphadenopathy.  ABDOMINAL WALL: Within normal limits.  BONES: Degenerative changes. Irregularity of the sacrococcygeal tip   containing sclerosis with adjacent soft tissue thickening. This may be   sequela of prior injury or chronic osteomyelitis. Correlate with prior   studies and consider MR if this concern for active osteomyelitis.   Osteoarthritis of bilateral hip joints with large bridging hypertrophic   osteophytosis identified at the level of the right hipjoint extending to   the iliac bone.    IMPRESSION:    Concentric wall thickening of distal ascending colon extending to the   hepatic flexure without significant pericolonic stranding. Consider   nonemergent correlation with colonoscopy to exclude underlying neoplasm.   No bowel obstruction.    Irregularity of the sacrococcygeal tip containing sclerosis with adjacent   soft tissue thickening. This may be sequela of prior injury or chronic   osteomyelitis. Correlate with prior studies and consider MR if this   concern for active osteomyelitis.    Additional findings as mentioned above.              --- End of Report ---            ERMIAS ROUSE MD; Attending Radiologist  This document has been electronically signed. 2022  6:25AM    < end of copied text >        Imaging Personally Reviewed:  [ ] YES  [ ] NO

## 2022-06-21 NOTE — PROGRESS NOTE ADULT - PROBLEM SELECTOR PLAN 4
- Continue on home medications - pt pmh of CVA, right sided hemiparesis, aphasia, dysphagia  - continue home meds eliquis, metoprolol, amiodarone and simvastatin  - continue gabapentin, baclofen   - aspiration and fall precautions  - speech consulted

## 2022-06-21 NOTE — CONSULT NOTE ADULT - SKIN COMMENTS
sacral ulcer that is only 2 x 1 cm in size and superficial ( stage 2), no drainage or malodor , no surrounding cellulitis

## 2022-06-21 NOTE — PROGRESS NOTE ADULT - PROBLEM SELECTOR PLAN 3
- Continue on home medications with holding parameters - pt pmh of afib, on home med eliquis, amiodarone and metoprolol  - continue home med eliquis, amiodarone, metoprolol  - HR <100  - controlled

## 2022-06-21 NOTE — CONSULT NOTE ADULT - SUBJECTIVE AND OBJECTIVE BOX
[  ] STAT REQUEST              [ x ] ROUTINE REQUEST    Patient is a 62 year old female with abdominal pain. GI consulted to evaluate.        HPI:  Patient is 62 years old female from [facility] with past medical history significant for CVA with hemiplegia/aphasia/dysphagia, HTN, Afib on Eliquis, HLD, presents with 4 days history of intermittent, crampy 6-7/10 intensity LLQ abdominal pain radiating diffusely associated with bloating. Patient denies nausea, vomiting, hematemesis, hematochezia, melena, fever, chills, chest pain, SOB, cough, hematuria, dysuria or diarrhea.        PAIN MANAGEMENT:  Pain Scale:                 6-7/10  Pain Location:  LLQ abdominal pain      Prior Colonoscopy:  Unknown      PAST MEDICAL HISTORY  Atrial fibrillation  CVA (cerebrovascular accident)  HTN  Hyperlipidemia  Hemiplegia  Dysphagia  Aphagia      PAST SURGICAL HISTORY  Tubal ligation  Thoracic aortic aneurysm repair  Hammer toe correction        Allergies    No Known Allergies    Intolerances  sugar (Rash)         MEDICATIONS  (STANDING):  aMIOdarone    Tablet 100 milliGRAM(s) Oral daily  apixaban 5 milliGRAM(s) Oral every 12 hours  ascorbic acid 500 milliGRAM(s) Oral daily  baclofen 10 milliGRAM(s) Oral every 8 hours  cholecalciferol 2000 Unit(s) Oral daily  ciprofloxacin   IVPB 400 milliGRAM(s) IV Intermittent every 12 hours  gabapentin 100 milliGRAM(s) Oral three times a day  influenza   Vaccine 0.5 milliLiter(s) IntraMuscular once  metoprolol succinate ER 25 milliGRAM(s) Oral daily  metroNIDAZOLE    Tablet 500 milliGRAM(s) Oral every 8 hours  simvastatin 10 milliGRAM(s) Oral at bedtime  sodium chloride 0.9%. 1000 milliLiter(s) (80 mL/Hr) IV Continuous <Continuous>    MEDICATIONS  (PRN):  acetaminophen     Tablet .. 650 milliGRAM(s) Oral every 6 hours PRN Mild Pain (1 - 3)      SOCIAL HISTORY  Advanced Directives:       [ X ] Full Code       [  ] DNR  Marital Status:         [  ] M      [ X ] S      [  ] D       [  ] W  Children:       [ X ] Yes      [  ] No  Occupation:        [  ] Employed       [ X ] Unemployed       [  ] Retired  Diet:       [ X ] Regular       [  ] PEG feeding          [  ] NG tube feeding  Drug Use:           [ X ] Patient denied          [  ] Yes  Alcohol:           [X  ] No             [  ] Yes (socially)         [  ] Yes (chronic)  Tobacco:           [  ] Yes           [ X ] No      FAMILY HISTORY  [ X ] Heart Disease            [ X ] Diabetes             [ X ] HTN             [  ] Colon Cancer             [  ] Stomach Cancer              [  ] Pancreatic Cancer      VITAL SIGNS   Vital Signs Last 24 Hrs  T(C): 36.4 (21 Jun 2022 13:03), Max: 36.7 (20 Jun 2022 19:52)  T(F): 97.5 (21 Jun 2022 13:03), Max: 98.1 (21 Jun 2022 00:01)  HR: 77 (21 Jun 2022 13:03) (52 - 88)  BP: 115/81 (21 Jun 2022 13:03) (104/59 - 128/63)   RR: 18 (21 Jun 2022 13:03) (18 - 18)  SpO2: 99% (21 Jun 2022 13:03) (97% - 100%)      CBC Full  -  ( 21 Jun 2022 09:10 )  WBC Count : 4.20 K/uL  RBC Count : 4.15 M/uL  Hemoglobin : 13.2 g/dL  Hematocrit : 40.1 %  Platelet Count - Automated : 169 K/uL  Mean Cell Volume : 96.6 fl  Mean Cell Hemoglobin : 31.8 pg  Mean Cell Hemoglobin Concentration : 32.9 gm/dL  Auto Neutrophil # : 2.49 K/uL  Auto Lymphocyte # : 1.10 K/uL  Auto Monocyte # : 0.50 K/uL  Auto Eosinophil # : 0.04 K/uL  Auto Basophil # : 0.06 K/uL  Auto Neutrophil % : 59.3 %  Auto Lymphocyte % : 26.2 %  Auto Monocyte % : 11.9 %  Auto Eosinophil % : 1.0 %  Auto Basophil % : 1.4 %      06-21    142  |  108  |  8   ----------------------------<  85  3.5   |  27  |  0.68    Ca    9.2      21 Jun 2022 09:10  Phos  3.7     06-21  Mg     2.1     06-21    TPro  7.5  /  Alb  3.2<L>  /  TBili  0.9  /  DBili  x   /  AST  10  /  ALT  18  /  AlkPhos  108  06-21     Urinalysis (06.20.22 @ 05:33)   Glucose Qualitative, Urine: Negative   Blood, Urine: Moderate   pH Urine: 7.0   Color: Yellow   Urine Appearance: Clear   Bilirubin: Negative   Ketone - Urine: Negative   Specific Gravity: 1.010   Protein, Urine: Negative   Urobilinogen: Negative   Nitrite: Negative   Leukocyte Esterase Concentration: Negative      ECG    Ventricular Rate 50 BPM    Atrial Rate 50 BPM    P-R Interval 206 ms    QRS Duration 94 ms    Q-T Interval 486 ms    QTC Calculation(Bazett) 443 ms    P Axis 64 degrees    R Axis -14 degrees    T Axis 12 degrees    Diagnosis Line Sinus bradycardia  Nonspecific T wave abnormality  Abnormal ECG           RADIOLOGY/IMAGING                  ACC: [id number] EXAM:  CT ABDOMEN AND PELVIS IC                          PROCEDURE DATE:  06/20/2022          INTERPRETATION:  CLINICAL INFORMATION: Abdominal pain.    COMPARISON: None.    CONTRAST/COMPLICATIONS:  IV Contrast: Omnipaque 350  90 cc administered  Oral Contrast: NONE  Complications: None reported at time of study completion    PROCEDURE:  CT of the Abdomen and Pelvis was performed.  Sagittal and coronal reformats were performed.    FINDINGS:  LOWER CHEST: Mild bibasilar dependent atelectasis. Partially imaged right   lower lobe nodule measuring up to 1.1 cm (3:2) for which nonemergent   pulmonary imaging follow-up is advised. Cardiomegaly. Small pericardial   effusion and/or thickening.    LIVER: Enlarged fatty liver measuring 23 cm in length.  BILE DUCTS: Normal caliber.  GALLBLADDER: Within normal limits.  SPLEEN: Within normal limits.  PANCREAS: Within normal limits.  ADRENALS: Within normal limits.  KIDNEYS/URETERS: No hydronephrosis. Left renal cyst.    BLADDER: Within normal limits.  REPRODUCTIVE ORGANS: Uterus and adnexa within normal limits.    BOWEL: There is concentric wall thickening of distal ascending colon   extending to the hepatic flexure without significant pericolonic   stranding. Consider nonemergent correlation with colonoscopy to exclude   underlying neoplasm. No bowel obstruction. Normal appendix. Abundant   fecal material throughout the colonic loops.  PERITONEUM: No ascites.  VESSELS: Atherosclerotic changes. Infrarenal IVC filter identified.  RETROPERITONEUM/LYMPH NODES: No lymphadenopathy.  ABDOMINAL WALL: Within normal limits.  BONES: Degenerative changes. Irregularity of the sacrococcygeal tip   containing sclerosis with adjacent soft tissue thickening. This may be   sequela of prior injury or chronic osteomyelitis. Correlate with prior   studies and consider MR if this concern for active osteomyelitis.   Osteoarthritis of bilateral hip joints with large bridging hypertrophic   osteophytosis identified at the level of the right hipjoint extending to   the iliac bone.    IMPRESSION:    Concentric wall thickening of distal ascending colon extending to the   hepatic flexure without significant pericolonic stranding. Consider   nonemergent correlation with colonoscopy to exclude underlying neoplasm.   No bowel obstruction.    Irregularity of the sacrococcygeal tip containing sclerosis with adjacent   soft tissue thickening. This may be sequela of prior injury or chronic   osteomyelitis. Correlate with prior studies and consider MR if this   concern for active osteomyelitis.

## 2022-06-21 NOTE — SWALLOW BEDSIDE ASSESSMENT ADULT - SLP REFERRING PHYSICIAN
Dr Ingrid Manley (PCP) MD Garcia cardiology Dr Velasquez Klein MD Díaz neurology Dr Garcia Dr Garcia Dr. Llamas MD Quinn Rudi Holden

## 2022-06-21 NOTE — PROGRESS NOTE ADULT - PROBLEM SELECTOR PLAN 1
- Patient presented with LLQ abdominal pain then spread to all over abdomen   - No change in bowel movement   - CT abdomen and pelvis IV contrast was ordered   - Likely colitis   - Will start empirically Cipro and Flagyl   - NPO except medications   - IV fluids 0.9% NS 80cc/hr   - Pain meds ( Tylenol / Oxycodone if Moderate / Severe Pain )  - f/u CT abdomen and Pelvis - Patient presented with LLQ abdominal pain then spread to all over abdomen   - CT abd/pelv shows possible neoplasm in distal ascending colon  - initially suspected colitis  - continue Cipro and Flagyl (started 6/20)  - continue IV fluids  - continue Pain meds  - advance diet as tolerated  - GI Dr. Jernigan consulted  - ID Dr. Magaña consulted - Patient presented with LLQ abdominal pain then spread to all over abdomen   - CT abd/pelv shows possible neoplasm in distal ascending colon  - initially suspected colitis, s/p Cipro and Flagyl  - continue IV fluids  - continue Pain meds  - advance diet as tolerated  - GI Dr. Jernigan consulted  - ID Dr. Magaña consulted

## 2022-06-21 NOTE — SWALLOW BEDSIDE ASSESSMENT ADULT - COMMENTS
Pt with h/o CVA, right sided weakness, currently on clear liquid diet. Alert and responsive, verbalizes wants and needs, aphasic, benefits from answering yes/no questions with verbal cueing. PO trials administered. P/w mild oral dysphagia - prolonged yet complete mastication, adequate bolus transport, clear oral cavity post swallow, no overt s/s of aspiration on solid foods and thin liquids po trials.

## 2022-06-21 NOTE — PROGRESS NOTE ADULT - PROBLEM SELECTOR PLAN 2
- Will continue on home meds   - HR is controlled   - Continue on Eliquis - pt p/w sacral ulcer x1 year  - CT abd/pelv shows concern for acute vs chronic sacral osteomyelitis  - afebrile, no leukocytosis  - continue tylenol prn for pain  - ID Dr. Magaña consulted

## 2022-06-21 NOTE — CONSULT NOTE ADULT - NEGATIVE GENERAL SYMPTOMS
no fever/no chills/no sweating/no anorexia/no polyphagia/no polyuria/no polydipsia
no fever/no chills

## 2022-06-21 NOTE — CONSULT NOTE ADULT - NEGATIVE ENMT SYMPTOMS
no hearing difficulty/no ear pain/no tinnitus/no vertigo/no sinus symptoms/no nasal congestion/no nasal discharge/no nasal obstruction/no nose bleeds/no gum bleeding/no dry mouth/no throat pain

## 2022-06-21 NOTE — PROGRESS NOTE ADULT - ASSESSMENT
62 years old female from [facility] with past medical history CVA with hemiplegia/aphasia/dysphagia, HTN, Afib on Eliquis, HLD, presents with abdominal pain. Patient states that pain started 4 days ago. it started on LLQ then spread to all her abdomen. Patient denied any change in bowel movement. Her last bowel movement was last night and it was normal.  Will admit the patient for suspected colitis. CT A/P shows concern for neoplasm in distal ascending colon and possible acute vs chronic osteomyelitis in sacrum. Pt had unstageable sacral ulcer for 1 year. wound care consulted. ID Dr. Magaña consulted 62 years old female from [facility] with past medical history CVA with hemiplegia/aphasia/dysphagia, HTN, Afib on Eliquis, HLD, presents with abdominal pain. Patient states that pain started 4 days ago. it started on LLQ then spread to all her abdomen. Admitted to medicine for suspected colitis. CT A/P shows concern for neoplasm in distal ascending colon, and possible acute vs chronic osteomyelitis in sacrum. Pt had unstageable sacral ulcer for 1 year. Wound care consulted. GI Dr. Jernigan and ID Dr. Magaña consulted.

## 2022-06-21 NOTE — PATIENT PROFILE ADULT - NSPROSPHOSPCHAPLAINYN_GEN_A_NUR
Patient did not show for appointment today, she should complete her blood work as ordered and make an appointment in the next few weeks for office visit with annual well visit yes

## 2022-06-21 NOTE — PROGRESS NOTE ADULT - ATTENDING COMMENTS
Patient seen and examined at bedside, d/w  resident in detail, GI consult and ID consult in view of sacral DU, possibly osteomyelitis.

## 2022-06-21 NOTE — CONSULT NOTE ADULT - NEGATIVE NEUROLOGICAL SYMPTOMS
no headache/no confusion
no syncope/no tremors/no vertigo/no loss of sensation/no difficulty walking/no headache/no loss of consciousness

## 2022-06-21 NOTE — CONSULT NOTE ADULT - GASTROINTESTINAL
details… soft/nondistended/normal active bowel sounds/no guarding/no rigidity/no organomegaly/no palpable tamara/tender

## 2022-06-21 NOTE — CONSULT NOTE ADULT - NEGATIVE GASTROINTESTINAL SYMPTOMS
no nausea/no vomiting/no diarrhea
no nausea/no vomiting/no diarrhea/no melena/no hematochezia/no steatorrhea/no jaundice

## 2022-06-21 NOTE — PATIENT PROFILE ADULT - MEDICATIONS/VISITS
"After Visit Summary    Patient: Emma Bynum  Date of Visit: 3/11/2019    Nasal Irrigation/Nasal Spray  o 2x/day  o 3 puffs in each nostril; sniff and then blow your nose    * reduce drainage that can contribute to cough  Cough:    Sip of water    Swallow    Gently biting the sides of the tongue + swallow    Suck on a lozenge with Pectin (avoid mint or menthol) or a sugar-free candy, gum, \"wet\" snacks (apples, pineapple, grapes, etc).  Also consider Xylitol products, like the Spry brand of lozenges, gum, spray    Puppy Sniffs - 2-3 small quick sniffs through the nose and exhale with  sh     massage    Wait \"urge surf\"  *Check neck tension by placing a hand on the neck (hand splint)    Breathing:    In the morning and evening (twice daily) for 2-5 minutes:   o Breathe while lying on your back with your face and knees up. Hands on tummy and chest.  Take a breath in with rounded lips and exhale with a  Shhhhhhhh    o Inhale  = Inflate; exhale = deflate  o 3x each: try breathin in/8 out  o Throughout the day (2-3x/day for just a couple minutes) check breathing while keeping shoulders relaxed (riding to and from school, etc.)      Breathing Tips:  o Breathe in through rounded lips and out with a  Shhhh   o Tongue behind the lower teeth  o Keep shoulders down and chest relaxed  o Feel the weight in your elbows, or hands  o Lead from your chest  o Keep breathing when you stop an activity; find your recovery pose (hands behind the back or on the knees)  o (less helpful today)Thera-band use during practices  o Match your breathing rate with the rate of stride/ activity  o Breathe in/ hold (1-2 seconds)/ exhale     Candice Lozano M.M. (voice), M.A., CCC/SLP  Speech-Language Pathologist  Certificate of Vocology  [facility]  [phone]  [email or web address]  Prounouns: she/her    " no

## 2022-06-22 LAB
ALBUMIN SERPL ELPH-MCNC: 3 G/DL — LOW (ref 3.5–5)
ALP SERPL-CCNC: 103 U/L — SIGNIFICANT CHANGE UP (ref 40–120)
ALT FLD-CCNC: 15 U/L DA — SIGNIFICANT CHANGE UP (ref 10–60)
ANION GAP SERPL CALC-SCNC: 9 MMOL/L — SIGNIFICANT CHANGE UP (ref 5–17)
AST SERPL-CCNC: 11 U/L — SIGNIFICANT CHANGE UP (ref 10–40)
BILIRUB SERPL-MCNC: 1.1 MG/DL — SIGNIFICANT CHANGE UP (ref 0.2–1.2)
BUN SERPL-MCNC: 9 MG/DL — SIGNIFICANT CHANGE UP (ref 7–18)
CALCIUM SERPL-MCNC: 9.1 MG/DL — SIGNIFICANT CHANGE UP (ref 8.4–10.5)
CHLORIDE SERPL-SCNC: 108 MMOL/L — SIGNIFICANT CHANGE UP (ref 96–108)
CHOLEST SERPL-MCNC: 123 MG/DL — SIGNIFICANT CHANGE UP
CO2 SERPL-SCNC: 25 MMOL/L — SIGNIFICANT CHANGE UP (ref 22–31)
CREAT SERPL-MCNC: 0.68 MG/DL — SIGNIFICANT CHANGE UP (ref 0.5–1.3)
EGFR: 100 ML/MIN/1.73M2 — SIGNIFICANT CHANGE UP
GLUCOSE SERPL-MCNC: 89 MG/DL — SIGNIFICANT CHANGE UP (ref 70–99)
HCT VFR BLD CALC: 39.1 % — SIGNIFICANT CHANGE UP (ref 34.5–45)
HDLC SERPL-MCNC: 48 MG/DL — LOW
HGB BLD-MCNC: 13 G/DL — SIGNIFICANT CHANGE UP (ref 11.5–15.5)
LIPID PNL WITH DIRECT LDL SERPL: 68 MG/DL — SIGNIFICANT CHANGE UP
MCHC RBC-ENTMCNC: 31.4 PG — SIGNIFICANT CHANGE UP (ref 27–34)
MCHC RBC-ENTMCNC: 33.2 GM/DL — SIGNIFICANT CHANGE UP (ref 32–36)
MCV RBC AUTO: 94.4 FL — SIGNIFICANT CHANGE UP (ref 80–100)
NON HDL CHOLESTEROL: 75 MG/DL — SIGNIFICANT CHANGE UP
NRBC # BLD: 0 /100 WBCS — SIGNIFICANT CHANGE UP (ref 0–0)
PLATELET # BLD AUTO: 186 K/UL — SIGNIFICANT CHANGE UP (ref 150–400)
POTASSIUM SERPL-MCNC: 3.3 MMOL/L — LOW (ref 3.5–5.3)
POTASSIUM SERPL-SCNC: 3.3 MMOL/L — LOW (ref 3.5–5.3)
PROT SERPL-MCNC: 7.2 G/DL — SIGNIFICANT CHANGE UP (ref 6–8.3)
RBC # BLD: 4.14 M/UL — SIGNIFICANT CHANGE UP (ref 3.8–5.2)
RBC # FLD: 13.3 % — SIGNIFICANT CHANGE UP (ref 10.3–14.5)
SODIUM SERPL-SCNC: 142 MMOL/L — SIGNIFICANT CHANGE UP (ref 135–145)
TRIGL SERPL-MCNC: 34 MG/DL — SIGNIFICANT CHANGE UP
WBC # BLD: 3.13 K/UL — LOW (ref 3.8–10.5)
WBC # FLD AUTO: 3.13 K/UL — LOW (ref 3.8–10.5)

## 2022-06-22 RX ORDER — SODIUM CHLORIDE 9 MG/ML
1000 INJECTION INTRAMUSCULAR; INTRAVENOUS; SUBCUTANEOUS
Refills: 0 | Status: DISCONTINUED | OUTPATIENT
Start: 2022-06-23 | End: 2022-06-23

## 2022-06-22 RX ORDER — SOD SULF/SODIUM/NAHCO3/KCL/PEG
4000 SOLUTION, RECONSTITUTED, ORAL ORAL ONCE
Refills: 0 | Status: COMPLETED | OUTPATIENT
Start: 2022-06-22 | End: 2022-06-22

## 2022-06-22 RX ORDER — POTASSIUM CHLORIDE 20 MEQ
40 PACKET (EA) ORAL ONCE
Refills: 0 | Status: COMPLETED | OUTPATIENT
Start: 2022-06-22 | End: 2022-06-22

## 2022-06-22 RX ADMIN — Medication 10 MILLIGRAM(S): at 22:14

## 2022-06-22 RX ADMIN — Medication 40 MILLIEQUIVALENT(S): at 12:47

## 2022-06-22 RX ADMIN — Medication 2000 UNIT(S): at 12:48

## 2022-06-22 RX ADMIN — SODIUM CHLORIDE 80 MILLILITER(S): 9 INJECTION INTRAMUSCULAR; INTRAVENOUS; SUBCUTANEOUS at 07:34

## 2022-06-22 RX ADMIN — GABAPENTIN 100 MILLIGRAM(S): 400 CAPSULE ORAL at 06:56

## 2022-06-22 RX ADMIN — Medication 10 MILLIGRAM(S): at 06:56

## 2022-06-22 RX ADMIN — GABAPENTIN 100 MILLIGRAM(S): 400 CAPSULE ORAL at 14:40

## 2022-06-22 RX ADMIN — GABAPENTIN 100 MILLIGRAM(S): 400 CAPSULE ORAL at 22:14

## 2022-06-22 RX ADMIN — Medication 10 MILLIGRAM(S): at 14:40

## 2022-06-22 RX ADMIN — APIXABAN 5 MILLIGRAM(S): 2.5 TABLET, FILM COATED ORAL at 06:56

## 2022-06-22 RX ADMIN — APIXABAN 5 MILLIGRAM(S): 2.5 TABLET, FILM COATED ORAL at 18:13

## 2022-06-22 RX ADMIN — Medication 25 MILLIGRAM(S): at 06:56

## 2022-06-22 RX ADMIN — Medication 500 MILLIGRAM(S): at 12:48

## 2022-06-22 RX ADMIN — SODIUM CHLORIDE 80 MILLILITER(S): 9 INJECTION INTRAMUSCULAR; INTRAVENOUS; SUBCUTANEOUS at 14:40

## 2022-06-22 RX ADMIN — Medication 4000 MILLILITER(S): at 14:40

## 2022-06-22 RX ADMIN — SIMVASTATIN 10 MILLIGRAM(S): 20 TABLET, FILM COATED ORAL at 22:14

## 2022-06-22 NOTE — PROGRESS NOTE ADULT - SUBJECTIVE AND OBJECTIVE BOX
[   ] ICU                                          [   ] CCU                                      [  X ] Medical Floor    Patient is a 62 year old female with abdominal pain. GI consulted to evaluate.         Patient is 62 years old female from [facility] with past medical history significant for CVA with hemiplegia/aphasia/dysphagia, HTN, Afib on Eliquis, HLD, presents with 4 days history of intermittent, crampy 6-7/10 intensity LLQ abdominal pain radiating diffusely associated with bloating. Patient denies nausea, vomiting, hematemesis, hematochezia, melena, fever, chills, chest pain, SOB, cough, hematuria, dysuria or diarrhea.      Patient appears comfortable. No new complaints reported, No nausea, vomiting, hematemesis, hematochezia, melena, fever, chills, chest pain, SOB, cough or diarrhea reported.      PAIN MANAGEMENT:  Pain Scale:                 2-3/10  Pain Location:  LLQ abdominal pain      Prior Colonoscopy:  Unknown      PAST MEDICAL HISTORY  Atrial fibrillation  CVA (cerebrovascular accident)  HTN  Hyperlipidemia  Hemiplegia  Dysphagia  Aphagia      PAST SURGICAL HISTORY  Tubal ligation  Thoracic aortic aneurysm repair  Hammer toe correction        Allergies    No Known Allergies    Intolerances  sugar (Rash)         MEDICATIONS  (STANDING):  aMIOdarone    Tablet 100 milliGRAM(s) Oral daily  apixaban 5 milliGRAM(s) Oral every 12 hours  ascorbic acid 500 milliGRAM(s) Oral daily  baclofen 10 milliGRAM(s) Oral every 8 hours  cholecalciferol 2000 Unit(s) Oral daily  ciprofloxacin   IVPB 400 milliGRAM(s) IV Intermittent every 12 hours  gabapentin 100 milliGRAM(s) Oral three times a day  influenza   Vaccine 0.5 milliLiter(s) IntraMuscular once  metoprolol succinate ER 25 milliGRAM(s) Oral daily  metroNIDAZOLE    Tablet 500 milliGRAM(s) Oral every 8 hours  simvastatin 10 milliGRAM(s) Oral at bedtime  sodium chloride 0.9%. 1000 milliLiter(s) (80 mL/Hr) IV Continuous <Continuous>    MEDICATIONS  (PRN):  acetaminophen     Tablet .. 650 milliGRAM(s) Oral every 6 hours PRN Mild Pain (1 - 3)      SOCIAL HISTORY  Advanced Directives:       [ X ] Full Code       [  ] DNR  Marital Status:         [  ] M      [ X ] S      [  ] D       [  ] W  Children:       [ X ] Yes      [  ] No  Occupation:        [  ] Employed       [ X ] Unemployed       [  ] Retired  Diet:       [ X ] Regular       [  ] PEG feeding          [  ] NG tube feeding  Drug Use:           [ X ] Patient denied          [  ] Yes  Alcohol:           [X  ] No             [  ] Yes (socially)         [  ] Yes (chronic)  Tobacco:           [  ] Yes           [ X ] No      FAMILY HISTORY  [ X ] Heart Disease            [ X ] Diabetes             [ X ] HTN             [  ] Colon Cancer             [  ] Stomach Cancer              [  ] Pancreatic Cancer        VITALS  Vital Signs Last 24 Hrs  T(C): 36.2 (22 Jun 2022 05:30), Max: 36.6 (21 Jun 2022 21:26)  T(F): 97.1 (22 Jun 2022 05:30), Max: 97.8 (21 Jun 2022 21:26)  HR: 65 (22 Jun 2022 05:30) (60 - 77)  BP: 104/54 (22 Jun 2022 05:30) (99/50 - 115/81)   RR: 18 (22 Jun 2022 05:30) (18 - 20)  SpO2: 98% (22 Jun 2022 05:30) (98% - 99%)       MEDICATIONS  (STANDING):  aMIOdarone    Tablet 100 milliGRAM(s) Oral daily  apixaban 5 milliGRAM(s) Oral every 12 hours  ascorbic acid 500 milliGRAM(s) Oral daily  baclofen 10 milliGRAM(s) Oral every 8 hours  cholecalciferol 2000 Unit(s) Oral daily  gabapentin 100 milliGRAM(s) Oral three times a day  influenza   Vaccine 0.5 milliLiter(s) IntraMuscular once  metoprolol succinate ER 25 milliGRAM(s) Oral daily  potassium chloride   Powder 40 milliEquivalent(s) Oral once  simvastatin 10 milliGRAM(s) Oral at bedtime  sodium chloride 0.9%. 1000 milliLiter(s) (80 mL/Hr) IV Continuous <Continuous>    MEDICATIONS  (PRN):  acetaminophen     Tablet .. 650 milliGRAM(s) Oral every 6 hours PRN Mild Pain (1 - 3)                            13.0   3.13  )-----------( 186      ( 22 Jun 2022 08:22 )             39.1       06-22    142  |  108  |  9   ----------------------------<  89  3.3<L>   |  25  |  0.68    Ca    9.1      22 Jun 2022 08:22  Phos  3.7     06-21  Mg     2.1     06-21    TPro  7.2  /  Alb  3.0<L>  /  TBili  1.1  /  DBili  x   /  AST  11  /  ALT  15  /  AlkPhos  103  06-22

## 2022-06-22 NOTE — PROGRESS NOTE ADULT - ASSESSMENT
1. Abdominal pain  2. Wall thickening of distal ascending colon  3. R/o colon cancer  4. Fecal impaction     Suggestions:    1. Clear liquid diet  2. Daily stool softener  3. Avoid NSAID  4. Protonix daily  5. Colonoscopy  6. DVT prophylaxis

## 2022-06-22 NOTE — PROGRESS NOTE ADULT - PROBLEM SELECTOR PLAN 2
- pt p/w sacral ulcer x1 year  - CT abd/pelv shows concern for acute vs chronic sacral osteomyelitis  - afebrile, no leukocytosis  - continue tylenol prn for pain  - ID Dr. Magaña consulted - pt p/w sacral ulcer x1 year  - CT abd/pelv shows concern for acute vs chronic sacral osteomyelitis  - afebrile, no leukocytosis  - continue tylenol prn for pain  - monitor off abx  - ID Dr. Magaña Awake/Alert

## 2022-06-22 NOTE — ADVANCED PRACTICE NURSE CONSULT - ASSESSMENT
This is a 62yr old female patient admitted for Gastroenteritis, presenting with the following:  -There is a healed wound to the Coccyx with hypopigmentation/scarring  -There is evidence of blanchable erythema to the Bilateral Heels

## 2022-06-22 NOTE — PROGRESS NOTE ADULT - PROBLEM SELECTOR PLAN 6
- Continue on home med simvastatin 10 mg daily  - f/u lipid panel - Continue on home med simvastatin 10 mg daily  - lipid panel noted

## 2022-06-22 NOTE — PROGRESS NOTE ADULT - ASSESSMENT
62 years old female from [facility] with past medical history CVA with hemiplegia/aphasia/dysphagia, HTN, Afib on Eliquis, HLD, presents with abdominal pain. Patient states that pain started 4 days ago. it started on LLQ then spread to all her abdomen. Admitted to medicine for suspected colitis. CT A/P shows concern for neoplasm in distal ascending colon, and possible acute vs chronic osteomyelitis in sacrum. Pt had unstageable sacral ulcer for 1 year. Wound care consulted. GI Dr. Jernigan and ID Dr. Magaña consulted.    62 years old female from [facility] with past medical history CVA with hemiplegia/aphasia/dysphagia, HTN, Afib on Eliquis, HLD, presents with abdominal pain. Admitted to medicine for suspected colitis. CT A/P shows concern for neoplasm in distal ascending colon, and possible acute vs chronic osteomyelitis in sacrum. GI Dr. Jernigan, plan for colonoscopy 6/23/22.  Pt had unstageable sacral ulcer for 1 year, ID murtaza monitor off abx.

## 2022-06-22 NOTE — PROGRESS NOTE ADULT - PROBLEM SELECTOR PLAN 1
- Patient presented with LLQ abdominal pain then spread to all over abdomen   - CT abd/pelv shows possible neoplasm in distal ascending colon  - initially suspected colitis, s/p Cipro and Flagyl  - continue IV fluids  - continue Pain meds  - advance diet as tolerated  - GI Dr. Jernigan consulted  - ID Dr. Magaña consulted - Patient presented with LLQ abdominal pain then spread to all over abdomen   - CT abd/pelv shows possible neoplasm in distal ascending colon  - initially suspected colitis, s/p Cipro and Flagyl  - continue IV fluids  - continue Pain meds  - NPO after MN, Colonoscopy 6/23/22  - GI Dr. Jernigan   - ID Dr. Magaña

## 2022-06-22 NOTE — PROGRESS NOTE ADULT - PROBLEM SELECTOR PLAN 3
- pt pmh of afib, on home med eliquis, amiodarone and metoprolol  - continue home med eliquis, amiodarone, metoprolol  - HR <100  - controlled

## 2022-06-22 NOTE — PROGRESS NOTE ADULT - SUBJECTIVE AND OBJECTIVE BOX
< from: CT Abdomen and Pelvis w/ IV Cont (06.20.22 @ 06:13) >  ACC: [id number] EXAM:  CT ABDOMEN AND PELVIS IC                          PROCEDURE DATE:  06/20/2022          INTERPRETATION:  CLINICAL INFORMATION: Abdominal pain.    COMPARISON: None.    CONTRAST/COMPLICATIONS:  IV Contrast: Omnipaque 350  90 cc administered  Oral Contrast: NONE  Complications: None reported at time of study completion    PROCEDURE:  CT of the Abdomen and Pelvis was performed.  Sagittal and coronal reformats were performed.    FINDINGS:  LOWER CHEST: Mild bibasilar dependent atelectasis. Partially imaged right   lower lobe nodule measuring up to 1.1 cm (3:2) for which nonemergent   pulmonary imaging follow-up is advised. Cardiomegaly. Small pericardial   effusion and/or thickening.    LIVER: Enlarged fatty liver measuring 23 cm in length.  BILE DUCTS: Normal caliber.  GALLBLADDER: Within normal limits.  SPLEEN: Within normal limits.  PANCREAS: Within normal limits.  ADRENALS: Within normal limits.  KIDNEYS/URETERS: No hydronephrosis. Left renal cyst.    BLADDER: Within normal limits.  REPRODUCTIVE ORGANS: Uterus and adnexa within normal limits.    BOWEL: There is concentric wall thickening of distal ascending colon   extending to the hepatic flexure without significant pericolonic   stranding. Consider nonemergent correlation with colonoscopy to exclude   underlying neoplasm. No bowel obstruction. Normal appendix. Abundant   fecal material throughout the colonic loops.  PERITONEUM: No ascites.  VESSELS: Atherosclerotic changes. Infrarenal IVC filter identified.  RETROPERITONEUM/LYMPH NODES: No lymphadenopathy.  ABDOMINAL WALL: Within normal limits.  BONES: Degenerative changes. Irregularity of the sacrococcygeal tip   containing sclerosis with adjacent soft tissue thickening. This may be   sequela of prior injury or chronic osteomyelitis. Correlate with prior   studies and consider MR if this concern for active osteomyelitis.   Osteoarthritis of bilateral hip joints with large bridging hypertrophic   osteophytosis identified at the level of the right hipjoint extending to   the iliac bone.    IMPRESSION:    Concentric wall thickening of distal ascending colon extending to the   hepatic flexure without significant pericolonic stranding. Consider   nonemergent correlation with colonoscopy to exclude underlying neoplasm.   No bowel obstruction.    Irregularity of the sacrococcygeal tip containing sclerosis with adjacent   soft tissue thickening. This may be sequela of prior injury or chronic   osteomyelitis. Correlate with prior studies and consider MR if this   concern for active osteomyelitis.    Additional findings as mentioned above.              --- End of Report ---            ERMIAS ROUSE MD; Attending Radiologist  This document has been electronically signed. Jun 20 2022  6:25AM    < end of copied text >   Patient is a 62y old  Female who presents with a chief complaint of Abdominal pain (22 Jun 2022 13:57)      INTERVAL HPI/OVERNIGHT EVENTS: no overnight events    I&O's Summary    22 Jun 2022 07:01  -  22 Jun 2022 14:08  --------------------------------------------------------  IN: 0 mL / OUT: 1000 mL / NET: -1000 mL      Vital Signs Last 24 Hrs  T(C): 36.2 (22 Jun 2022 05:30), Max: 36.6 (21 Jun 2022 21:26)  T(F): 97.1 (22 Jun 2022 05:30), Max: 97.8 (21 Jun 2022 21:26)  HR: 65 (22 Jun 2022 05:30) (60 - 65)  BP: 104/54 (22 Jun 2022 05:30) (99/50 - 104/54)  BP(mean): --  RR: 18 (22 Jun 2022 05:30) (18 - 20)  SpO2: 98% (22 Jun 2022 05:30) (98% - 99%)  PAST MEDICAL & SURGICAL HISTORY:  Atrial fibrillation  Diagnosed in 2014. controlled, managed with aspirin and beta blocker      Thoracic aortic aneurysm  4 cm noted on CT scan on 3/2019      CVA (cerebrovascular accident)      H/O tubal ligation  1992      H/O hammer toe correction  2003          SOCIAL HISTORY  Alcohol:  Tobacco:  Illicit substance use:      FAMILY HISTORY:      LABS:                        13.0   3.13  )-----------( 186      ( 22 Jun 2022 08:22 )             39.1     06-22    142  |  108  |  9   ----------------------------<  89  3.3<L>   |  25  |  0.68    Ca    9.1      22 Jun 2022 08:22  Phos  3.7     06-21  Mg     2.1     06-21    TPro  7.2  /  Alb  3.0<L>  /  TBili  1.1  /  DBili  x   /  AST  11  /  ALT  15  /  AlkPhos  103  06-22        CAPILLARY BLOOD GLUCOSE                MEDICATIONS  (STANDING):  aMIOdarone    Tablet 100 milliGRAM(s) Oral daily  apixaban 5 milliGRAM(s) Oral every 12 hours  ascorbic acid 500 milliGRAM(s) Oral daily  baclofen 10 milliGRAM(s) Oral every 8 hours  cholecalciferol 2000 Unit(s) Oral daily  gabapentin 100 milliGRAM(s) Oral three times a day  influenza   Vaccine 0.5 milliLiter(s) IntraMuscular once  metoprolol succinate ER 25 milliGRAM(s) Oral daily  polyethylene glycol/electrolyte Solution. 4000 milliLiter(s) Oral once  simvastatin 10 milliGRAM(s) Oral at bedtime  sodium chloride 0.9%. 1000 milliLiter(s) (80 mL/Hr) IV Continuous <Continuous>    MEDICATIONS  (PRN):  acetaminophen     Tablet .. 650 milliGRAM(s) Oral every 6 hours PRN Mild Pain (1 - 3)      REVIEW OF SYSTEMS:  CONSTITUTIONAL: No fever  EYES: No eye pain, visual disturbances  ENMT:  No difficulty hearing,  No sinus or throat pain  NECK: No pain or stiffness  RESPIRATORY: No cough; No shortness of breath  CARDIOVASCULAR: No chest pain  GASTROINTESTINAL: No abdominal pain. No nausea, vomiting, or diarrhea.  GENITOURINARY: No dysuria  NEUROLOGICAL: No headaches, numbness, or tremors  SKIN: No rashes, or lesions   MUSCULOSKELETAL: No joint pain or swelling      RADIOLOGY & ADDITIONAL TESTS:  < from: CT Abdomen and Pelvis w/ IV Cont (06.20.22 @ 06:13) >    ACC: [id number] EXAM:  CT ABDOMEN AND PELVIS IC                          PROCEDURE DATE:  06/20/2022          INTERPRETATION:  CLINICAL INFORMATION: Abdominal pain.    COMPARISON: None.    CONTRAST/COMPLICATIONS:  IV Contrast: Omnipaque 350  90 cc administered  Oral Contrast: NONE  Complications: None reported at time of study completion    PROCEDURE:  CT of the Abdomen and Pelvis was performed.  Sagittal and coronal reformats were performed.    FINDINGS:  LOWER CHEST: Mild bibasilar dependent atelectasis. Partially imaged right   lower lobe nodule measuring up to 1.1 cm (3:2) for which nonemergent   pulmonary imaging follow-up is advised. Cardiomegaly. Small pericardial   effusion and/or thickening.    LIVER: Enlarged fatty liver measuring 23 cm in length.  BILE DUCTS: Normal caliber.  GALLBLADDER: Within normal limits.  SPLEEN: Within normal limits.  PANCREAS: Within normal limits.  ADRENALS: Within normal limits.  KIDNEYS/URETERS: No hydronephrosis. Left renal cyst.    BLADDER: Within normal limits.  REPRODUCTIVE ORGANS: Uterus and adnexa within normal limits.    BOWEL: There is concentric wall thickening of distal ascending colon   extending to the hepatic flexure without significant pericolonic   stranding. Consider nonemergent correlation with colonoscopy to exclude   underlying neoplasm. No bowel obstruction. Normal appendix. Abundant   fecal material throughout the colonic loops.  PERITONEUM: No ascites.  VESSELS: Atherosclerotic changes. Infrarenal IVC filter identified.  RETROPERITONEUM/LYMPH NODES: No lymphadenopathy.  ABDOMINAL WALL: Within normal limits.  BONES: Degenerative changes. Irregularity of the sacrococcygeal tip   containing sclerosis with adjacent soft tissue thickening. This may be   sequela of prior injury or chronic osteomyelitis. Correlate with prior   studies and consider MR if this concern for active osteomyelitis.   Osteoarthritis of bilateral hip joints with large bridging hypertrophic   osteophytosis identified at the level of the right hipjoint extending to   the iliac bone.    IMPRESSION:    Concentric wall thickening of distal ascending colon extending to the   hepatic flexure without significant pericolonic stranding. Consider   nonemergent correlation with colonoscopy to exclude underlying neoplasm.   No bowel obstruction.    Irregularity of the sacrococcygeal tip containing sclerosis with adjacent   soft tissue thickening. This may be sequela of prior injury or chronic   osteomyelitis. Correlate with prior studies and consider MR if this   concern for active osteomyelitis.    Additional findings as mentioned above.              --- End of Report ---    < end of copied text >    Imaging Personally Reviewed:  [ x] YES  [ ] NO    Consultant(s) Notes Reviewed:  [x ] YES  [ ] NO    PHYSICAL EXAM:  GENERAL: NA  HEAD:  Atraumatic, Normocephalic  EYES: conjunctiva and sclera clear  ENMT: Moist mucous membranes,  NECK: Supple, No JVD,   NERVOUS SYSTEM:  Alert & Oriented X3, slurred speech  CHEST/LUNG: Clear to auscultation bilaterally; No rales, rhonchi, wheezing, or rubs  HEART: Regular rate and rhythm; No murmurs, rubs, or gallops  ABDOMEN: Soft, Nontender, Nondistended; Bowel sounds present  EXTREMITIES:  2+ Peripheral Pulses, No clubbing, cyanosis, or edema  SKIN: No rashes or lesions    Care Collaborated Discussed with Consultants/Other Providers [x YES  [ ] NO

## 2022-06-23 LAB
ALBUMIN SERPL ELPH-MCNC: 3.1 G/DL — LOW (ref 3.5–5)
ALP SERPL-CCNC: 109 U/L — SIGNIFICANT CHANGE UP (ref 40–120)
ALT FLD-CCNC: 19 U/L DA — SIGNIFICANT CHANGE UP (ref 10–60)
ANION GAP SERPL CALC-SCNC: 8 MMOL/L — SIGNIFICANT CHANGE UP (ref 5–17)
AST SERPL-CCNC: 12 U/L — SIGNIFICANT CHANGE UP (ref 10–40)
BILIRUB SERPL-MCNC: 1 MG/DL — SIGNIFICANT CHANGE UP (ref 0.2–1.2)
BUN SERPL-MCNC: 6 MG/DL — LOW (ref 7–18)
CALCIUM SERPL-MCNC: 9.3 MG/DL — SIGNIFICANT CHANGE UP (ref 8.4–10.5)
CHLORIDE SERPL-SCNC: 109 MMOL/L — HIGH (ref 96–108)
CO2 SERPL-SCNC: 26 MMOL/L — SIGNIFICANT CHANGE UP (ref 22–31)
CREAT SERPL-MCNC: 0.71 MG/DL — SIGNIFICANT CHANGE UP (ref 0.5–1.3)
EGFR: 97 ML/MIN/1.73M2 — SIGNIFICANT CHANGE UP
GLUCOSE SERPL-MCNC: 86 MG/DL — SIGNIFICANT CHANGE UP (ref 70–99)
HCT VFR BLD CALC: 42.3 % — SIGNIFICANT CHANGE UP (ref 34.5–45)
HGB BLD-MCNC: 13.9 G/DL — SIGNIFICANT CHANGE UP (ref 11.5–15.5)
INR BLD: 1.67 RATIO — HIGH (ref 0.88–1.16)
MCHC RBC-ENTMCNC: 31.4 PG — SIGNIFICANT CHANGE UP (ref 27–34)
MCHC RBC-ENTMCNC: 32.9 GM/DL — SIGNIFICANT CHANGE UP (ref 32–36)
MCV RBC AUTO: 95.5 FL — SIGNIFICANT CHANGE UP (ref 80–100)
NRBC # BLD: 0 /100 WBCS — SIGNIFICANT CHANGE UP (ref 0–0)
PLATELET # BLD AUTO: 182 K/UL — SIGNIFICANT CHANGE UP (ref 150–400)
POTASSIUM SERPL-MCNC: 3.7 MMOL/L — SIGNIFICANT CHANGE UP (ref 3.5–5.3)
POTASSIUM SERPL-SCNC: 3.7 MMOL/L — SIGNIFICANT CHANGE UP (ref 3.5–5.3)
PROT SERPL-MCNC: 7.5 G/DL — SIGNIFICANT CHANGE UP (ref 6–8.3)
PROTHROM AB SERPL-ACNC: 20 SEC — HIGH (ref 10.5–13.4)
RBC # BLD: 4.43 M/UL — SIGNIFICANT CHANGE UP (ref 3.8–5.2)
RBC # FLD: 13.3 % — SIGNIFICANT CHANGE UP (ref 10.3–14.5)
SARS-COV-2 RNA SPEC QL NAA+PROBE: SIGNIFICANT CHANGE UP
SODIUM SERPL-SCNC: 143 MMOL/L — SIGNIFICANT CHANGE UP (ref 135–145)
WBC # BLD: 3.03 K/UL — LOW (ref 3.8–10.5)
WBC # FLD AUTO: 3.03 K/UL — LOW (ref 3.8–10.5)

## 2022-06-23 RX ORDER — SODIUM CHLORIDE 9 MG/ML
1000 INJECTION INTRAMUSCULAR; INTRAVENOUS; SUBCUTANEOUS
Refills: 0 | Status: DISCONTINUED | OUTPATIENT
Start: 2022-06-24 | End: 2022-06-24

## 2022-06-23 RX ADMIN — SODIUM CHLORIDE 80 MILLILITER(S): 9 INJECTION INTRAMUSCULAR; INTRAVENOUS; SUBCUTANEOUS at 04:09

## 2022-06-23 RX ADMIN — Medication 10 MILLIGRAM(S): at 14:04

## 2022-06-23 RX ADMIN — Medication 10 MILLIGRAM(S): at 21:51

## 2022-06-23 RX ADMIN — Medication 2000 UNIT(S): at 14:04

## 2022-06-23 RX ADMIN — SIMVASTATIN 10 MILLIGRAM(S): 20 TABLET, FILM COATED ORAL at 21:51

## 2022-06-23 RX ADMIN — APIXABAN 5 MILLIGRAM(S): 2.5 TABLET, FILM COATED ORAL at 06:43

## 2022-06-23 RX ADMIN — GABAPENTIN 100 MILLIGRAM(S): 400 CAPSULE ORAL at 06:43

## 2022-06-23 RX ADMIN — Medication 10 MILLIGRAM(S): at 06:43

## 2022-06-23 RX ADMIN — GABAPENTIN 100 MILLIGRAM(S): 400 CAPSULE ORAL at 14:04

## 2022-06-23 RX ADMIN — GABAPENTIN 100 MILLIGRAM(S): 400 CAPSULE ORAL at 21:52

## 2022-06-23 RX ADMIN — Medication 500 MILLIGRAM(S): at 14:04

## 2022-06-23 NOTE — DIETITIAN INITIAL EVALUATION ADULT - NSFNSPHYEXAMSKINFT_GEN_A_CORE
Pressure Injury 1: coccyx, Unstageable  Pressure Injury 2: none, none  Pressure Injury 3: none, none  Pressure Injury 4: none, none  Pressure Injury 5: none, none  Pressure Injury 6: none, none  Pressure Injury 7: none, none  Pressure Injury 8: none, none  Pressure Injury 9: none, none  Pressure Injury 10: none, none  Pressure Injury 11: none, none

## 2022-06-23 NOTE — PROGRESS NOTE ADULT - SUBJECTIVE AND OBJECTIVE BOX
[   ] ICU                                          [   ] CCU                                      [ X  ] Medical Floor    Patient is a 62 year old female with abdominal pain. GI consulted to evaluate.         Patient is 62 years old female from [facility] with past medical history significant for CVA with hemiplegia/aphasia/dysphagia, HTN, Afib on Eliquis, HLD, presents with 4 days history of intermittent, crampy 6-7/10 intensity LLQ abdominal pain radiating diffusely associated with bloating. Patient denies nausea, vomiting, hematemesis, hematochezia, melena, fever, chills, chest pain, SOB, cough, hematuria, dysuria or diarrhea.      Patient appears comfortable. No new complaints reported, No nausea, vomiting, hematemesis, hematochezia, melena, fever, chills, chest pain, SOB, cough or diarrhea reported.      PAIN MANAGEMENT:  Pain Scale:                 2-3/10  Pain Location:  LLQ abdominal pain      Prior Colonoscopy:  Unknown      PAST MEDICAL HISTORY  Atrial fibrillation  CVA (cerebrovascular accident)  HTN  Hyperlipidemia  Hemiplegia  Dysphagia  Aphagia      PAST SURGICAL HISTORY  Tubal ligation  Thoracic aortic aneurysm repair  Hammer toe correction        Allergies    No Known Allergies    Intolerances  sugar (Rash)         MEDICATIONS  (STANDING):  aMIOdarone    Tablet 100 milliGRAM(s) Oral daily  apixaban 5 milliGRAM(s) Oral every 12 hours  ascorbic acid 500 milliGRAM(s) Oral daily  baclofen 10 milliGRAM(s) Oral every 8 hours  cholecalciferol 2000 Unit(s) Oral daily  ciprofloxacin   IVPB 400 milliGRAM(s) IV Intermittent every 12 hours  gabapentin 100 milliGRAM(s) Oral three times a day  influenza   Vaccine 0.5 milliLiter(s) IntraMuscular once  metoprolol succinate ER 25 milliGRAM(s) Oral daily  metroNIDAZOLE    Tablet 500 milliGRAM(s) Oral every 8 hours  simvastatin 10 milliGRAM(s) Oral at bedtime  sodium chloride 0.9%. 1000 milliLiter(s) (80 mL/Hr) IV Continuous <Continuous>    MEDICATIONS  (PRN):  acetaminophen     Tablet .. 650 milliGRAM(s) Oral every 6 hours PRN Mild Pain (1 - 3)      SOCIAL HISTORY  Advanced Directives:       [ X ] Full Code       [  ] DNR  Marital Status:         [  ] M      [ X ] S      [  ] D       [  ] W  Children:       [ X ] Yes      [  ] No  Occupation:        [  ] Employed       [ X ] Unemployed       [  ] Retired  Diet:       [ X ] Regular       [  ] PEG feeding          [  ] NG tube feeding  Drug Use:           [ X ] Patient denied          [  ] Yes  Alcohol:           [X  ] No             [  ] Yes (socially)         [  ] Yes (chronic)  Tobacco:           [  ] Yes           [ X ] No      FAMILY HISTORY  [ X ] Heart Disease            [ X ] Diabetes             [ X ] HTN             [  ] Colon Cancer             [  ] Stomach Cancer              [  ] Pancreatic Cancer      VITALS  Vital Signs Last 24 Hrs  T(C): 36.2 (23 Jun 2022 12:53), Max: 36.4 (22 Jun 2022 21:49)  T(F): 97.2 (23 Jun 2022 12:53), Max: 97.5 (22 Jun 2022 21:49)  HR: 66 (23 Jun 2022 12:53) (63 - 72)  BP: 113/82 (23 Jun 2022 12:53) (93/56 - 113/82)   RR: 18 (23 Jun 2022 12:53) (18 - 20)  SpO2: 98% (23 Jun 2022 12:53) (98% - 98%)       MEDICATIONS  (STANDING):  aMIOdarone    Tablet 100 milliGRAM(s) Oral daily  ascorbic acid 500 milliGRAM(s) Oral daily  baclofen 10 milliGRAM(s) Oral every 8 hours  cholecalciferol 2000 Unit(s) Oral daily  gabapentin 100 milliGRAM(s) Oral three times a day  influenza   Vaccine 0.5 milliLiter(s) IntraMuscular once  metoprolol succinate ER 25 milliGRAM(s) Oral daily  simvastatin 10 milliGRAM(s) Oral at bedtime  sodium chloride 0.9%. 1000 milliLiter(s) (80 mL/Hr) IV Continuous <Continuous>    MEDICATIONS  (PRN):  acetaminophen     Tablet .. 650 milliGRAM(s) Oral every 6 hours PRN Mild Pain (1 - 3)                            13.9   3.03  )-----------( 182      ( 23 Jun 2022 09:27 )             42.3       06-23    143  |  109<H>  |  6<L>  ----------------------------<  86  3.7   |  26  |  0.71    Ca    9.3      23 Jun 2022 09:27    TPro  7.5  /  Alb  3.1<L>  /  TBili  1.0  /  DBili  x   /  AST  12  /  ALT  19  /  AlkPhos  109  06-23      PT/INR - ( 23 Jun 2022 09:27 )   PT: 20.0 sec;   INR: 1.67 ratio

## 2022-06-23 NOTE — PROGRESS NOTE ADULT - ASSESSMENT
62 years old female from [facility] with past medical history CVA with hemiplegia/aphasia/dysphagia, HTN, Afib on Eliquis, HLD, presents with abdominal pain. Admitted to medicine for suspected colitis. CT A/P shows concern for neoplasm in distal ascending colon, and possible acute vs chronic osteomyelitis in sacrum. GI Dr. Jernigan, plan for colonoscopy 6/23/22.  Pt had unstageable sacral ulcer for 1 year, ID murtaza monitor off abx.

## 2022-06-23 NOTE — DIETITIAN INITIAL EVALUATION ADULT - OTHER INFO
Pt visited. Pt is Alert . Pt Unable to Provide much information. Pt is NPO / Clear Liquid  =  4 days. Pt is from [facility]. S/P SLP eval on 6/21.Pt w H/O CVA.  Easy to chew  Thin liquids. Pt with Pressure ulcer Unstageable to coccyx. Labs noted. HT estimated 68 inches, Bed  scale 200 lb  Pt is on CLEAR LIQUID , for Colonoscopy on 6/23

## 2022-06-23 NOTE — PROGRESS NOTE ADULT - PROBLEM SELECTOR PLAN 3
- pt pmh of afib, on home med eliquis, amiodarone and metoprolol  - continue home med eliquis, amiodarone, metoprolol  - HR <100  - controlled - pt pmh of afib, on home med eliquis, amiodarone and metoprolol  - continue home med amiodarone, metoprolol  - eliquis d/c pending colonoscopy 6/24/22  - HR <100  - controlled

## 2022-06-23 NOTE — PROGRESS NOTE ADULT - PROBLEM SELECTOR PLAN 2
- pt p/w sacral ulcer x1 year  - CT abd/pelv shows concern for acute vs chronic sacral osteomyelitis  - afebrile, no leukocytosis  - continue tylenol prn for pain  - monitor off abx  - ID Dr. Magaña

## 2022-06-23 NOTE — PROGRESS NOTE ADULT - ASSESSMENT
1. Abdominal pain  2. Wall thickening of distal ascending colon  3. R/o colon cancer  4. Fecal impaction     Suggestions:    1. Clear liquid diet  2. Daily stool softener  3. Avoid NSAID  4. Protonix daily  5. Colonoscopy postponed to tomorrow  6. Hold Eliquis  7. DVT prophylaxis

## 2022-06-23 NOTE — DIETITIAN INITIAL EVALUATION ADULT - PERTINENT LABORATORY DATA
06-23    143  |  109<H>  |  6<L>  ----------------------------<  86  3.7   |  26  |  0.71    Ca    9.3      23 Jun 2022 09:27    TPro  7.5  /  Alb  3.1<L>  /  TBili  1.0  /  DBili  x   /  AST  12  /  ALT  19  /  AlkPhos  109  06-23

## 2022-06-23 NOTE — PROGRESS NOTE ADULT - PROBLEM SELECTOR PLAN 4
- pt pmh of CVA, right sided hemiparesis, aphasia, dysphagia  - continue home meds eliquis, metoprolol, amiodarone and simvastatin  - continue gabapentin, baclofen   - aspiration and fall precautions  - speech eval noted - pt pmh of CVA, right sided hemiparesis, aphasia, dysphagia  - continue home meds metoprolol, amiodarone and simvastatin  - continue gabapentin, baclofen   - aspiration and fall precautions  - HOld eliquis for colonoscopy 6/24/22  - speech eval noted

## 2022-06-23 NOTE — PROGRESS NOTE ADULT - PROBLEM SELECTOR PLAN 1
- Patient presented with LLQ abdominal pain then spread to all over abdomen   - CT abd/pelv shows possible neoplasm in distal ascending colon  - initially suspected colitis, s/p Cipro and Flagyl  - continue IV fluids  - continue Pain meds  - NPO after MN, Colonoscopy 6/23/22  - GI Dr. Jernigan   - ID Dr. Magaña - Patient presented with LLQ abdominal pain then spread to all over abdomen   - CT abd/pelv shows possible neoplasm in distal ascending colon  - initially suspected colitis, s/p Cipro and Flagyl  - continue IV fluids  - continue Pain meds  - NPO after MN, Colonoscopy 6/24/22  - GI Dr. Jernigan   - ID Dr. Magaña

## 2022-06-23 NOTE — PROGRESS NOTE ADULT - SUBJECTIVE AND OBJECTIVE BOX
Patient is a 62y old  Female who presents with a chief complaint of Abdominal pain (22 Jun 2022 13:57)      INTERVAL HPI/OVERNIGHT EVENTS: no overnight events    I&O's Summary    22 Jun 2022 07:01  -  23 Jun 2022 07:00  --------------------------------------------------------  IN: 0 mL / OUT: 1900 mL / NET: -1900 mL    23 Jun 2022 07:01  -  23 Jun 2022 13:09  --------------------------------------------------------  IN: 0 mL / OUT: 1200 mL / NET: -1200 mL      Vital Signs Last 24 Hrs  T(C): 36.2 (23 Jun 2022 12:53), Max: 36.4 (22 Jun 2022 21:49)  T(F): 97.2 (23 Jun 2022 12:53), Max: 97.5 (22 Jun 2022 21:49)  HR: 66 (23 Jun 2022 12:53) (63 - 72)  BP: 113/82 (23 Jun 2022 12:53) (93/56 - 113/82)  BP(mean): --  RR: 18 (23 Jun 2022 12:53) (18 - 20)  SpO2: 98% (23 Jun 2022 12:53) (98% - 98%)  PAST MEDICAL & SURGICAL HISTORY:  Atrial fibrillation  Diagnosed in 2014. controlled, managed with aspirin and beta blocker      Thoracic aortic aneurysm  4 cm noted on CT scan on 3/2019      CVA (cerebrovascular accident)      H/O tubal ligation  1992      H/O hammer toe correction  2003          SOCIAL HISTORY  Alcohol:  Tobacco:  Illicit substance use:      FAMILY HISTORY:      LABS:                        13.9   3.03  )-----------( 182      ( 23 Jun 2022 09:27 )             42.3     06-23    143  |  109<H>  |  6<L>  ----------------------------<  86  3.7   |  26  |  0.71    Ca    9.3      23 Jun 2022 09:27    TPro  7.5  /  Alb  3.1<L>  /  TBili  1.0  /  DBili  x   /  AST  12  /  ALT  19  /  AlkPhos  109  06-23    PT/INR - ( 23 Jun 2022 09:27 )   PT: 20.0 sec;   INR: 1.67 ratio             CAPILLARY BLOOD GLUCOSE                MEDICATIONS  (STANDING):  aMIOdarone    Tablet 100 milliGRAM(s) Oral daily  apixaban 5 milliGRAM(s) Oral every 12 hours  ascorbic acid 500 milliGRAM(s) Oral daily  baclofen 10 milliGRAM(s) Oral every 8 hours  cholecalciferol 2000 Unit(s) Oral daily  gabapentin 100 milliGRAM(s) Oral three times a day  influenza   Vaccine 0.5 milliLiter(s) IntraMuscular once  metoprolol succinate ER 25 milliGRAM(s) Oral daily  simvastatin 10 milliGRAM(s) Oral at bedtime  sodium chloride 0.9%. 1000 milliLiter(s) (80 mL/Hr) IV Continuous <Continuous>    MEDICATIONS  (PRN):  acetaminophen     Tablet .. 650 milliGRAM(s) Oral every 6 hours PRN Mild Pain (1 - 3)      REVIEW OF SYSTEMS:  CONSTITUTIONAL: No fever  EYES: No eye pain, visual disturbances   ENMT:  No difficulty hearing,  No sinus or throat pain  NECK: No pain or stiffness  RESPIRATORY: No cough, No shortness of breath  CARDIOVASCULAR: No chest pain, palpitations, dizziness, or leg swelling  GASTROINTESTINAL: No abdominal pain. No nausea, vomiting.  GENITOURINARY: No dysuria  NEUROLOGICAL: No headaches  SKIN: No rashes, or lesions   MUSCULOSKELETAL: No joint pain or swelling      RADIOLOGY & ADDITIONAL TESTS: < from: CT Abdomen and Pelvis w/ IV Cont (06.20.22 @ 06:13) >    ACC: [id number] EXAM:  CT ABDOMEN AND PELVIS IC                          PROCEDURE DATE:  06/20/2022          INTERPRETATION:  CLINICAL INFORMATION: Abdominal pain.    COMPARISON: None.    CONTRAST/COMPLICATIONS:  IV Contrast: Omnipaque 350  90 cc administered  Oral Contrast: NONE  Complications: None reported at time of study completion    PROCEDURE:  CT of the Abdomen and Pelvis was performed.  Sagittal and coronal reformats were performed.    FINDINGS:  LOWER CHEST: Mild bibasilar dependent atelectasis. Partially imaged right   lower lobe nodule measuring up to 1.1 cm (3:2) for which nonemergent   pulmonary imaging follow-up is advised. Cardiomegaly. Small pericardial   effusion and/or thickening.    LIVER: Enlarged fatty liver measuring 23 cm in length.  BILE DUCTS: Normal caliber.  GALLBLADDER: Within normal limits.  SPLEEN: Within normal limits.  PANCREAS: Within normal limits.  ADRENALS: Within normal limits.  KIDNEYS/URETERS: No hydronephrosis. Left renal cyst.    BLADDER: Within normal limits.  REPRODUCTIVE ORGANS: Uterus and adnexa within normal limits.    BOWEL: There is concentric wall thickening of distal ascending colon   extending to the hepatic flexure without significant pericolonic   stranding. Consider nonemergent correlation with colonoscopy to exclude   underlying neoplasm. No bowel obstruction. Normal appendix. Abundant   fecal material throughout the colonic loops.  PERITONEUM: No ascites.  VESSELS: Atherosclerotic changes. Infrarenal IVC filter identified.  RETROPERITONEUM/LYMPH NODES: No lymphadenopathy.  ABDOMINAL WALL: Within normal limits.  BONES: Degenerative changes. Irregularity of the sacrococcygeal tip   containing sclerosis with adjacent soft tissue thickening. This may be   sequela of prior injury or chronic osteomyelitis. Correlate with prior   studies and consider MR if this concern for active osteomyelitis.   Osteoarthritis of bilateral hip joints with large bridging hypertrophic   osteophytosis identified at the level of the right hipjoint extending to   the iliac bone.    IMPRESSION:    Concentric wall thickening of distal ascending colon extending to the   hepatic flexure without significant pericolonic stranding. Consider   nonemergent correlation with colonoscopy to exclude underlying neoplasm.   No bowel obstruction.    Irregularity of the sacrococcygeal tip containing sclerosis with adjacent   soft tissue thickening. This may be sequela of prior injury or chronic   osteomyelitis. Correlate with prior studies and consider MR if this   concern for active osteomyelitis.    Additional findings as mentioned above.              --- End of Report ---            ERMIAS ROUSE MD; Attending Radiologist  This document has been electronically signed. Jun 20 2022  6:25AM    < end of copied text >      Imaging Personally Reviewed:  [x ] YES  [ ] NO    Consultant(s) Notes Reviewed:  [x ] YES  [ ] NO    PHYSICAL EXAM:  GENERAL: NAD  HEAD:  Atraumatic, Normocephalic  EYES: conjunctiva and sclera clear  ENMT: Moist mucous membranes  NECK: Supple, No JVD  NERVOUS SYSTEM:  Alert & Oriented X1-2, slurred   CHEST/LUNG: Clear to auscultation bilaterally; No rales, rhonchi, wheezing, or rubs  HEART: Regular rate and rhythm; No murmurs, rubs, or gallops  ABDOMEN: Soft, Nontender, Nondistended; Bowel sounds present  EXTREMITIES:  2+ Peripheral Pulses, No clubbing, cyanosis, or edema  SKIN: No rashes or lesions    Care Collaborated Discussed with Consultants/Other Providers [ x] YES  [ ] NO

## 2022-06-23 NOTE — DIETITIAN INITIAL EVALUATION ADULT - PERTINENT MEDS FT
MEDICATIONS  (STANDING):  aMIOdarone    Tablet 100 milliGRAM(s) Oral daily  ascorbic acid 500 milliGRAM(s) Oral daily  baclofen 10 milliGRAM(s) Oral every 8 hours  cholecalciferol 2000 Unit(s) Oral daily  gabapentin 100 milliGRAM(s) Oral three times a day  influenza   Vaccine 0.5 milliLiter(s) IntraMuscular once  metoprolol succinate ER 25 milliGRAM(s) Oral daily  simvastatin 10 milliGRAM(s) Oral at bedtime  sodium chloride 0.9%. 1000 milliLiter(s) (80 mL/Hr) IV Continuous <Continuous>    MEDICATIONS  (PRN):  acetaminophen     Tablet .. 650 milliGRAM(s) Oral every 6 hours PRN Mild Pain (1 - 3)

## 2022-06-23 NOTE — DIETITIAN INITIAL EVALUATION ADULT - NS FNS DIET ORDER
Diet, NPO after Midnight:      NPO Start Date: 23-Jun-2022,   NPO Start Time: 23:59 (06-23-22 @ 13:24)  Diet, Clear Liquid (06-23-22 @ 13:24)

## 2022-06-24 ENCOUNTER — RESULT REVIEW (OUTPATIENT)
Age: 62
End: 2022-06-24

## 2022-06-24 ENCOUNTER — TRANSCRIPTION ENCOUNTER (OUTPATIENT)
Age: 62
End: 2022-06-24

## 2022-06-24 LAB
ANION GAP SERPL CALC-SCNC: 5 MMOL/L — SIGNIFICANT CHANGE UP (ref 5–17)
BUN SERPL-MCNC: 5 MG/DL — LOW (ref 7–18)
CALCIUM SERPL-MCNC: 8.9 MG/DL — SIGNIFICANT CHANGE UP (ref 8.4–10.5)
CHLORIDE SERPL-SCNC: 110 MMOL/L — HIGH (ref 96–108)
CO2 SERPL-SCNC: 27 MMOL/L — SIGNIFICANT CHANGE UP (ref 22–31)
CREAT SERPL-MCNC: 0.63 MG/DL — SIGNIFICANT CHANGE UP (ref 0.5–1.3)
EGFR: 101 ML/MIN/1.73M2 — SIGNIFICANT CHANGE UP
GLUCOSE SERPL-MCNC: 85 MG/DL — SIGNIFICANT CHANGE UP (ref 70–99)
HCT VFR BLD CALC: 38.1 % — SIGNIFICANT CHANGE UP (ref 34.5–45)
HGB BLD-MCNC: 12.5 G/DL — SIGNIFICANT CHANGE UP (ref 11.5–15.5)
MAGNESIUM SERPL-MCNC: 2 MG/DL — SIGNIFICANT CHANGE UP (ref 1.6–2.6)
MCHC RBC-ENTMCNC: 31.1 PG — SIGNIFICANT CHANGE UP (ref 27–34)
MCHC RBC-ENTMCNC: 32.8 GM/DL — SIGNIFICANT CHANGE UP (ref 32–36)
MCV RBC AUTO: 94.8 FL — SIGNIFICANT CHANGE UP (ref 80–100)
NRBC # BLD: 0 /100 WBCS — SIGNIFICANT CHANGE UP (ref 0–0)
PHOSPHATE SERPL-MCNC: 3.2 MG/DL — SIGNIFICANT CHANGE UP (ref 2.5–4.5)
PLATELET # BLD AUTO: 166 K/UL — SIGNIFICANT CHANGE UP (ref 150–400)
POTASSIUM SERPL-MCNC: 3.7 MMOL/L — SIGNIFICANT CHANGE UP (ref 3.5–5.3)
POTASSIUM SERPL-SCNC: 3.7 MMOL/L — SIGNIFICANT CHANGE UP (ref 3.5–5.3)
RBC # BLD: 4.02 M/UL — SIGNIFICANT CHANGE UP (ref 3.8–5.2)
RBC # FLD: 13.2 % — SIGNIFICANT CHANGE UP (ref 10.3–14.5)
SODIUM SERPL-SCNC: 142 MMOL/L — SIGNIFICANT CHANGE UP (ref 135–145)
WBC # BLD: 3.04 K/UL — LOW (ref 3.8–10.5)
WBC # FLD AUTO: 3.04 K/UL — LOW (ref 3.8–10.5)

## 2022-06-24 PROCEDURE — 88305 TISSUE EXAM BY PATHOLOGIST: CPT | Mod: 26

## 2022-06-24 RX ORDER — POLYETHYLENE GLYCOL 3350 17 G/17G
17 POWDER, FOR SOLUTION ORAL DAILY
Refills: 0 | Status: DISCONTINUED | OUTPATIENT
Start: 2022-06-24 | End: 2022-06-27

## 2022-06-24 RX ADMIN — GABAPENTIN 100 MILLIGRAM(S): 400 CAPSULE ORAL at 22:24

## 2022-06-24 RX ADMIN — GABAPENTIN 100 MILLIGRAM(S): 400 CAPSULE ORAL at 15:39

## 2022-06-24 RX ADMIN — SIMVASTATIN 10 MILLIGRAM(S): 20 TABLET, FILM COATED ORAL at 22:24

## 2022-06-24 RX ADMIN — Medication 10 MILLIGRAM(S): at 22:24

## 2022-06-24 RX ADMIN — SODIUM CHLORIDE 80 MILLILITER(S): 9 INJECTION INTRAMUSCULAR; INTRAVENOUS; SUBCUTANEOUS at 00:01

## 2022-06-24 RX ADMIN — Medication 10 MILLIGRAM(S): at 06:21

## 2022-06-24 RX ADMIN — GABAPENTIN 100 MILLIGRAM(S): 400 CAPSULE ORAL at 06:21

## 2022-06-24 RX ADMIN — POLYETHYLENE GLYCOL 3350 17 GRAM(S): 17 POWDER, FOR SOLUTION ORAL at 15:40

## 2022-06-24 RX ADMIN — Medication 10 MILLIGRAM(S): at 15:39

## 2022-06-24 NOTE — PROGRESS NOTE ADULT - PROBLEM SELECTOR PLAN 7
- DVT- eliquis on HOLD colonoscpy 6/24/22  - GI- protonix - DVT- eliquis on HOLD colonoscpy 6/24/22, resume 6/25/22- GI recc  - GI- protonix

## 2022-06-24 NOTE — PROGRESS NOTE ADULT - PROBLEM SELECTOR PLAN 1
- Patient presented with LLQ abdominal pain then spread to all over abdomen   - CT abd/pelv shows possible neoplasm in distal ascending colon  - initially suspected colitis, s/p Cipro and Flagyl  - continue IV fluids  - continue Pain meds  - NPO after MN, Colonoscopy 6/24/22, Eliquis HELD  - GI Dr. Jernigan   - ID Dr. Magaña

## 2022-06-24 NOTE — PROGRESS NOTE ADULT - PROBLEM SELECTOR PLAN 4
- pt pmh of CVA, right sided hemiparesis, aphasia, dysphagia  - continue home meds metoprolol, amiodarone and simvastatin  - continue gabapentin, baclofen   - aspiration and fall precautions  - HOld eliquis for colonoscopy 6/24/22  - speech eval noted - pt pmh of CVA, right sided hemiparesis, aphasia, dysphagia  - continue home meds metoprolol, amiodarone and simvastatin  - continue gabapentin, baclofen   - aspiration and fall precautions  - HOld eliquis for colonoscopy 6/24/22  - resume eleiquis 6/25/22- GI recc  - speech eval noted

## 2022-06-24 NOTE — PROGRESS NOTE ADULT - PROBLEM SELECTOR PLAN 3
- pt pmh of afib, on home med eliquis, amiodarone and metoprolol  - continue home med amiodarone, metoprolol  - eliquis d/c pending colonoscopy 6/24/22  - HR <100  - controlled - pt pmh of afib, on home med eliquis, amiodarone and metoprolol  - continue home med amiodarone, metoprolol  - eliquis d/c pending colonoscopy 6/24/22  - Resume eliquis 6/25/22- GI recc  - HR <100  - controlled

## 2022-06-24 NOTE — PROGRESS NOTE ADULT - SUBJECTIVE AND OBJECTIVE BOX
Patient is a 62y old  Female who presents with a chief complaint of Abdominal pain (23 Jun 2022 16:23)      INTERVAL HPI/OVERNIGHT EVENTS: no overnight events    I&O's Summary    23 Jun 2022 07:01  -  24 Jun 2022 07:00  --------------------------------------------------------  IN: 0 mL / OUT: 1550 mL / NET: -1550 mL      Vital Signs Last 24 Hrs  T(C): 36.2 (24 Jun 2022 06:07), Max: 36.6 (23 Jun 2022 21:17)  T(F): 97.1 (24 Jun 2022 06:07), Max: 97.8 (23 Jun 2022 21:17)  HR: 67 (24 Jun 2022 06:07) (62 - 67)  BP: 104/56 (24 Jun 2022 06:07) (92/53 - 113/82)  BP(mean): --  RR: 17 (24 Jun 2022 06:07) (16 - 18)  SpO2: 98% (24 Jun 2022 06:07) (97% - 98%)  PAST MEDICAL & SURGICAL HISTORY:  Atrial fibrillation  Diagnosed in 2014. controlled, managed with aspirin and beta blocker      Thoracic aortic aneurysm  4 cm noted on CT scan on 3/2019      CVA (cerebrovascular accident)      H/O tubal ligation  1992      H/O hammer toe correction  2003          SOCIAL HISTORY  Alcohol:  Tobacco:  Illicit substance use:      FAMILY HISTORY:      LABS:                        12.5   3.04  )-----------( 166      ( 24 Jun 2022 09:25 )             38.1     06-24    142  |  110<H>  |  5<L>  ----------------------------<  85  3.7   |  27  |  0.63    Ca    8.9      24 Jun 2022 09:25  Phos  3.2     06-24  Mg     2.0     06-24    TPro  7.5  /  Alb  3.1<L>  /  TBili  1.0  /  DBili  x   /  AST  12  /  ALT  19  /  AlkPhos  109  06-23    PT/INR - ( 23 Jun 2022 09:27 )   PT: 20.0 sec;   INR: 1.67 ratio             CAPILLARY BLOOD GLUCOSE                MEDICATIONS  (STANDING):  aMIOdarone    Tablet 100 milliGRAM(s) Oral daily  ascorbic acid 500 milliGRAM(s) Oral daily  baclofen 10 milliGRAM(s) Oral every 8 hours  cholecalciferol 2000 Unit(s) Oral daily  gabapentin 100 milliGRAM(s) Oral three times a day  influenza   Vaccine 0.5 milliLiter(s) IntraMuscular once  metoprolol succinate ER 25 milliGRAM(s) Oral daily  simvastatin 10 milliGRAM(s) Oral at bedtime  sodium chloride 0.9%. 1000 milliLiter(s) (80 mL/Hr) IV Continuous <Continuous>    MEDICATIONS  (PRN):  acetaminophen     Tablet .. 650 milliGRAM(s) Oral every 6 hours PRN Mild Pain (1 - 3)      REVIEW OF SYSTEMS:  CONSTITUTIONAL: No fever  EYES: No eye pain, visual disturbances  ENMT:  No difficulty hearing,  No sinus or throat pain  NECK: No pain or stiffness  RESPIRATORY: No cough; No shortness of breath  CARDIOVASCULAR: No chest pain, palpitations, dizziness, or leg swelling  GASTROINTESTINAL: No abdominal pain. No nausea, vomiting,  No diarrhea or constipation.   GENITOURINARY: No dysuria  NEUROLOGICAL: No headaches,  numbness, or tremors  SKIN: No rashes, or lesions   MUSCULOSKELETAL: No joint pain or swelling      RADIOLOGY & ADDITIONAL TESTS:< from: CT Abdomen and Pelvis w/ IV Cont (06.20.22 @ 06:13) >    ACC: [id number] EXAM:  CT ABDOMEN AND PELVIS IC                          PROCEDURE DATE:  06/20/2022          INTERPRETATION:  CLINICAL INFORMATION: Abdominal pain.    COMPARISON: None.    CONTRAST/COMPLICATIONS:  IV Contrast: Omnipaque 350  90 cc administered  Oral Contrast: NONE  Complications: None reported at time of study completion    PROCEDURE:  CT of the Abdomen and Pelvis was performed.  Sagittal and coronal reformats were performed.    FINDINGS:  LOWER CHEST: Mild bibasilar dependent atelectasis. Partially imaged right   lower lobe nodule measuring up to 1.1 cm (3:2) for which nonemergent   pulmonary imaging follow-up is advised. Cardiomegaly. Small pericardial   effusion and/or thickening.    LIVER: Enlarged fatty liver measuring 23 cm in length.  BILE DUCTS: Normal caliber.  GALLBLADDER: Within normal limits.  SPLEEN: Within normal limits.  PANCREAS: Within normal limits.  ADRENALS: Within normal limits.  KIDNEYS/URETERS: No hydronephrosis. Left renal cyst.    BLADDER: Within normal limits.  REPRODUCTIVE ORGANS: Uterus and adnexa within normal limits.    BOWEL: There is concentric wall thickening of distal ascending colon   extending to the hepatic flexure without significant pericolonic   stranding. Consider nonemergent correlation with colonoscopy to exclude   underlying neoplasm. No bowel obstruction. Normal appendix. Abundant   fecal material throughout the colonic loops.  PERITONEUM: No ascites.  VESSELS: Atherosclerotic changes. Infrarenal IVC filter identified.  RETROPERITONEUM/LYMPH NODES: No lymphadenopathy.  ABDOMINAL WALL: Within normal limits.  BONES: Degenerative changes. Irregularity of the sacrococcygeal tip   containing sclerosis with adjacent soft tissue thickening. This may be   sequela of prior injury or chronic osteomyelitis. Correlate with prior   studies and consider MR if this concern for active osteomyelitis.   Osteoarthritis of bilateral hip joints with large bridging hypertrophic   osteophytosis identified at the level of the right hipjoint extending to   the iliac bone.    IMPRESSION:    Concentric wall thickening of distal ascending colon extending to the   hepatic flexure without significant pericolonic stranding. Consider   nonemergent correlation with colonoscopy to exclude underlying neoplasm.   No bowel obstruction.    Irregularity of the sacrococcygeal tip containing sclerosis with adjacent   soft tissue thickening. This may be sequela of prior injury or chronic   osteomyelitis. Correlate with prior studies and consider MR if this   concern for active osteomyelitis.    Additional findings as mentioned above.              --- End of Report ---            ERMIAS ROUSE MD; Attending Radiologist  This document has been electronically signed. Jun 20 2022  6:25AM    < end of copied text >      Imaging Personally Reviewed:  [x ] YES  [ ] NO    Consultant(s) Notes Reviewed:  [x ] YES  [ ] NO    PHYSICAL EXAM:  GENERAL: NAD  HEAD:  Atraumatic, Normocephalic  EYES:  conjunctiva and sclera clear  ENMT:  Moist mucous membranes  NECK: Supple, No JVD  NERVOUS SYSTEM:  Alert & Oriented X3, Good concentration  CHEST/LUNG: Clear to auscultation bilaterally  HEART: Regular rate and rhythm  ABDOMEN: Soft, Nontender, Nondistended; Bowel sounds present  EXTREMITIES:  2+ Peripheral Pulses, No clubbing, cyanosis, or edema  SKIN: No rashes or lesions    Care Collaborated Discussed with Consultants/Other Providers [x ] YES  [ ] NO

## 2022-06-24 NOTE — PROGRESS NOTE ADULT - ASSESSMENT
62 years old female from [facility] with past medical history CVA with hemiplegia/aphasia/dysphagia, HTN, Afib on Eliquis, HLD, presents with abdominal pain. Admitted to medicine for suspected colitis. CT A/P shows concern for neoplasm in distal ascending colon, and possible acute vs chronic osteomyelitis in sacrum. Pt had unstageable sacral ulcer for 1 year, ID murtaza monitor off abx.  GI Dr. Jernigan, plan for colonoscopy 6/24/22.

## 2022-06-25 LAB
ANION GAP SERPL CALC-SCNC: 7 MMOL/L — SIGNIFICANT CHANGE UP (ref 5–17)
BUN SERPL-MCNC: 8 MG/DL — SIGNIFICANT CHANGE UP (ref 7–18)
CALCIUM SERPL-MCNC: 9 MG/DL — SIGNIFICANT CHANGE UP (ref 8.4–10.5)
CHLORIDE SERPL-SCNC: 110 MMOL/L — HIGH (ref 96–108)
CO2 SERPL-SCNC: 25 MMOL/L — SIGNIFICANT CHANGE UP (ref 22–31)
CREAT SERPL-MCNC: 0.61 MG/DL — SIGNIFICANT CHANGE UP (ref 0.5–1.3)
EGFR: 102 ML/MIN/1.73M2 — SIGNIFICANT CHANGE UP
GLUCOSE SERPL-MCNC: 88 MG/DL — SIGNIFICANT CHANGE UP (ref 70–99)
HCT VFR BLD CALC: 37.5 % — SIGNIFICANT CHANGE UP (ref 34.5–45)
HGB BLD-MCNC: 12.5 G/DL — SIGNIFICANT CHANGE UP (ref 11.5–15.5)
MCHC RBC-ENTMCNC: 31.7 PG — SIGNIFICANT CHANGE UP (ref 27–34)
MCHC RBC-ENTMCNC: 33.3 GM/DL — SIGNIFICANT CHANGE UP (ref 32–36)
MCV RBC AUTO: 95.2 FL — SIGNIFICANT CHANGE UP (ref 80–100)
NRBC # BLD: 0 /100 WBCS — SIGNIFICANT CHANGE UP (ref 0–0)
PLATELET # BLD AUTO: 170 K/UL — SIGNIFICANT CHANGE UP (ref 150–400)
POTASSIUM SERPL-MCNC: 3.4 MMOL/L — LOW (ref 3.5–5.3)
POTASSIUM SERPL-SCNC: 3.4 MMOL/L — LOW (ref 3.5–5.3)
RBC # BLD: 3.94 M/UL — SIGNIFICANT CHANGE UP (ref 3.8–5.2)
RBC # FLD: 13.1 % — SIGNIFICANT CHANGE UP (ref 10.3–14.5)
SODIUM SERPL-SCNC: 142 MMOL/L — SIGNIFICANT CHANGE UP (ref 135–145)
WBC # BLD: 3.51 K/UL — LOW (ref 3.8–10.5)
WBC # FLD AUTO: 3.51 K/UL — LOW (ref 3.8–10.5)

## 2022-06-25 RX ORDER — METOPROLOL TARTRATE 50 MG
25 TABLET ORAL DAILY
Refills: 0 | Status: DISCONTINUED | OUTPATIENT
Start: 2022-06-25 | End: 2022-06-27

## 2022-06-25 RX ORDER — APIXABAN 2.5 MG/1
5 TABLET, FILM COATED ORAL EVERY 12 HOURS
Refills: 0 | Status: DISCONTINUED | OUTPATIENT
Start: 2022-06-25 | End: 2022-06-27

## 2022-06-25 RX ORDER — POTASSIUM CHLORIDE 20 MEQ
40 PACKET (EA) ORAL ONCE
Refills: 0 | Status: COMPLETED | OUTPATIENT
Start: 2022-06-25 | End: 2022-06-25

## 2022-06-25 RX ADMIN — Medication 10 MILLIGRAM(S): at 21:22

## 2022-06-25 RX ADMIN — Medication 2000 UNIT(S): at 12:20

## 2022-06-25 RX ADMIN — Medication 10 MILLIGRAM(S): at 06:52

## 2022-06-25 RX ADMIN — Medication 650 MILLIGRAM(S): at 15:20

## 2022-06-25 RX ADMIN — POLYETHYLENE GLYCOL 3350 17 GRAM(S): 17 POWDER, FOR SOLUTION ORAL at 12:20

## 2022-06-25 RX ADMIN — SIMVASTATIN 10 MILLIGRAM(S): 20 TABLET, FILM COATED ORAL at 21:22

## 2022-06-25 RX ADMIN — GABAPENTIN 100 MILLIGRAM(S): 400 CAPSULE ORAL at 06:51

## 2022-06-25 RX ADMIN — GABAPENTIN 100 MILLIGRAM(S): 400 CAPSULE ORAL at 14:23

## 2022-06-25 RX ADMIN — APIXABAN 5 MILLIGRAM(S): 2.5 TABLET, FILM COATED ORAL at 06:52

## 2022-06-25 RX ADMIN — APIXABAN 5 MILLIGRAM(S): 2.5 TABLET, FILM COATED ORAL at 17:17

## 2022-06-25 RX ADMIN — Medication 500 MILLIGRAM(S): at 12:20

## 2022-06-25 RX ADMIN — Medication 10 MILLIGRAM(S): at 14:21

## 2022-06-25 RX ADMIN — GABAPENTIN 100 MILLIGRAM(S): 400 CAPSULE ORAL at 21:21

## 2022-06-25 RX ADMIN — Medication 40 MILLIEQUIVALENT(S): at 12:20

## 2022-06-25 RX ADMIN — Medication 650 MILLIGRAM(S): at 14:44

## 2022-06-25 NOTE — PROGRESS NOTE ADULT - SUBJECTIVE AND OBJECTIVE BOX
Medical attending follow up:    Patient is a 62y old  Female who presents with a chief complaint of Abdominal pain (23 Jun 2022 16:23)          INTERVAL HPI/OVERNIGHT EVENTS: no overnight events      Vital Signs Last 24 Hrs  T(C): 36.7 (25 Jun 2022 12:00), Max: 36.7 (24 Jun 2022 20:44)  T(F): 98 (25 Jun 2022 12:00), Max: 98.1 (24 Jun 2022 20:44)  HR: 61 (25 Jun 2022 12:00) (58 - 68)  BP: 101/49 (25 Jun 2022 12:00) (94/52 - 105/52)  BP(mean): --  RR: 18 (25 Jun 2022 12:00) (18 - 20)  SpO2: 98% (25 Jun 2022 12:00) (97% - 98%)      PAST MEDICAL & SURGICAL HISTORY:  Atrial fibrillation  Diagnosed in 2014. controlled, managed with aspirin and beta blocker, eliquis      Thoracic aortic aneurysm  4 cm noted on CT scan on 3/2019      CVA (cerebrovascular accident)      H/O tubal ligation  1992      H/O hammer toe correction  2003          SOCIAL HISTORY  Alcohol:  Tobacco:  Illicit substance use:      FAMILY HISTORY:      LABS:                                   12.5   3.51  )-----------( 170      ( 25 Jun 2022 08:25 )             37.5      06-25    142  |  110<H>  |  8   ----------------------------<  88  3.4<L>   |  25  |  0.61    Ca    9.0      25 Jun 2022 08:25  Phos  3.2     06-24  Mg     2.0     06-24             CAPILLARY BLOOD GLUCOSE                MEDICATIONS  (STANDING):  aMIOdarone    Tablet 100 milliGRAM(s) Oral daily  ascorbic acid 500 milliGRAM(s) Oral daily  baclofen 10 milliGRAM(s) Oral every 8 hours  cholecalciferol 2000 Unit(s) Oral daily  gabapentin 100 milliGRAM(s) Oral three times a day  influenza   Vaccine 0.5 milliLiter(s) IntraMuscular once  metoprolol succinate ER 25 milliGRAM(s) Oral daily  simvastatin 10 milliGRAM(s) Oral at bedtime  sodium chloride 0.9%. 1000 milliLiter(s) (80 mL/Hr) IV Continuous <Continuous>    MEDICATIONS  (PRN):  acetaminophen     Tablet .. 650 milliGRAM(s) Oral every 6 hours PRN Mild Pain (1 - 3)      REVIEW OF SYSTEMS:  CONSTITUTIONAL: No fever  EYES: No eye pain, visual disturbances  ENMT:  No difficulty hearing,  No sinus or throat pain  NECK: No pain or stiffness  RESPIRATORY: No cough; No shortness of breath  CARDIOVASCULAR: No chest pain, palpitations, dizziness, or leg swelling  GASTROINTESTINAL: No abdominal pain. No nausea, vomiting,  No diarrhea or constipation.   GENITOURINARY: No dysuria  NEUROLOGICAL: right sided weakness  SKIN: No rashes, or lesions   MUSCULOSKELETAL: No joint pain or swelling      RADIOLOGY & ADDITIONAL TESTS:< from: CT Abdomen and Pelvis w/ IV Cont (06.20.22 @ 06:13) >    ACC: [id number] EXAM:  CT ABDOMEN AND PELVIS IC                          PROCEDURE DATE:  06/20/2022          INTERPRETATION:  CLINICAL INFORMATION: Abdominal pain.    COMPARISON: None.    CONTRAST/COMPLICATIONS:  IV Contrast: Omnipaque 350  90 cc administered  Oral Contrast: NONE  Complications: None reported at time of study completion    PROCEDURE:  CT of the Abdomen and Pelvis was performed.  Sagittal and coronal reformats were performed.    FINDINGS:  LOWER CHEST: Mild bibasilar dependent atelectasis. Partially imaged right   lower lobe nodule measuring up to 1.1 cm (3:2) for which nonemergent   pulmonary imaging follow-up is advised. Cardiomegaly. Small pericardial   effusion and/or thickening.    LIVER: Enlarged fatty liver measuring 23 cm in length.  BILE DUCTS: Normal caliber.  GALLBLADDER: Within normal limits.  SPLEEN: Within normal limits.  PANCREAS: Within normal limits.  ADRENALS: Within normal limits.  KIDNEYS/URETERS: No hydronephrosis. Left renal cyst.    BLADDER: Within normal limits.  REPRODUCTIVE ORGANS: Uterus and adnexa within normal limits.    BOWEL: There is concentric wall thickening of distal ascending colon   extending to the hepatic flexure without significant pericolonic   stranding. Consider nonemergent correlation with colonoscopy to exclude   underlying neoplasm. No bowel obstruction. Normal appendix. Abundant   fecal material throughout the colonic loops.  PERITONEUM: No ascites.  VESSELS: Atherosclerotic changes. Infrarenal IVC filter identified.  RETROPERITONEUM/LYMPH NODES: No lymphadenopathy.  ABDOMINAL WALL: Within normal limits.  BONES: Degenerative changes. Irregularity of the sacrococcygeal tip   containing sclerosis with adjacent soft tissue thickening. This may be   sequela of prior injury or chronic osteomyelitis. Correlate with prior   studies and consider MR if this concern for active osteomyelitis.   Osteoarthritis of bilateral hip joints with large bridging hypertrophic   osteophytosis identified at the level of the right hipjoint extending to   the iliac bone.    IMPRESSION:    Concentric wall thickening of distal ascending colon extending to the   hepatic flexure without significant pericolonic stranding. Consider   nonemergent correlation with colonoscopy to exclude underlying neoplasm.   No bowel obstruction.    Irregularity of the sacrococcygeal tip containing sclerosis with adjacent   soft tissue thickening. This may be sequela of prior injury or chronic   osteomyelitis. Correlate with prior studies and consider MR if this   concern for active osteomyelitis.    Additional findings as mentioned above.              --- End of Report ---            ERMIAS ROUSE MD; Attending Radiologist  This document has been electronically signed. Jun 20 2022  6:25AM    < end of copied text >      Imaging Personally Reviewed:  [x ] YES  [ ] NO    Consultant(s) Notes Reviewed:  [x ] YES  [ ] NO    PHYSICAL EXAM:  GENERAL: NAD  HEAD:  Atraumatic, Normocephalic  EYES:  conjunctiva and sclera clear  ENMT:  Moist mucous membranes  NECK: Supple, No JVD  NERVOUS SYSTEM:  Alert & Oriented X3, Good concentration, right hemiplegia  CHEST/LUNG: Clear to auscultation bilaterally  HEART: Regular rate and rhythm  ABDOMEN: Soft, Nontender, Nondistended; Bowel sounds present  EXTREMITIES:  2+ Peripheral Pulses, No clubbing, cyanosis, or edema  SKIN: No rashes or lesions    Care Collaborated Discussed with Consultants/Other Providers [x ] YES  [ ] NO

## 2022-06-25 NOTE — PROGRESS NOTE ADULT - PROBLEM SELECTOR PLAN 4
- pt pmh of CVA, right sided hemiparesis, aphasia, dysphagia  - continue home meds metoprolol, amiodarone and simvastatin  - continue gabapentin, baclofen   - aspiration and fall precautions  - HOld eliquis for colonoscopy 6/24/22  - resume eleiquis 6/25/22- GI recc  - speech eval noted

## 2022-06-25 NOTE — PROGRESS NOTE ADULT - ASSESSMENT
62 years old female from [facility] with past medical history CVA with hemiplegia/aphasia/dysphagia, HTN, Afib on Eliquis, HLD, presents with abdominal pain. Admitted to medicine for suspected colitis. CT A/P shows concern for neoplasm in distal ascending colon, and possible acute vs chronic osteomyelitis in sacrum. Pt had unstageable sacral ulcer for 1 year, ID murtaza monitor off abx.  GI Dr. Jernigan, on case

## 2022-06-25 NOTE — PROGRESS NOTE ADULT - SUBJECTIVE AND OBJECTIVE BOX
62y Female is under our care for chronic osteomyelitis of sacrum/ coccyx region. Patient was seen at bedside and appears to be doing well. Admits she has some abdominal discomfort likely from gas. Remains afebrile with normal wbc count. Remains off antibiotics at this time.    MEDS: no antibiotics    ALLERGIES: Allergies    No Known Allergies    Intolerances    sugar (Rash)    REVIEW OF SYSTEMS:  [  ] Not able to illicit  General: no fevers no malaise  Chest: no cough no sob  GI: no nvd +gassiness  : no urinary sxs   Skin: no rashes  Musculoskeletal: no trauma no LBP  Neuro: no ha's no dizziness 	    VITALS:  Vital Signs Last 24 Hrs  T(C): 36.7 (25 Jun 2022 12:00), Max: 36.7 (24 Jun 2022 20:44)  T(F): 98 (25 Jun 2022 12:00), Max: 98.1 (24 Jun 2022 20:44)  HR: 61 (25 Jun 2022 12:00) (58 - 68)  BP: 101/49 (25 Jun 2022 12:00) (94/52 - 105/52)  BP(mean): --  RR: 18 (25 Jun 2022 12:00) (18 - 20)  SpO2: 98% (25 Jun 2022 12:00) (97% - 98%)    PHYSICAL EXAM:  HEENT: n/a  Neck: supple no LN's   Respiratory: lungs clear no rales  Cardiovascular: S1 S2 reg no murmurs  Gastrointestinal: +BS with soft, nondistended abdomen; nontender  : +permafit cath  Extremities: no edema, right arm atrophy and contracture  Skin: no rashes  Ortho: n/a  Neuro: AAO x 3, right hemiplegia      LABS/DIAGNOSTIC TESTS:                        12.5   3.51  )-----------( 170      ( 25 Jun 2022 08:25 )             37.5     WBC Count: 3.51 K/uL (06-25 @ 08:25)  WBC Count: 3.04 K/uL (06-24 @ 09:25)  WBC Count: 3.03 K/uL (06-23 @ 09:27)  WBC Count: 3.13 K/uL (06-22 @ 08:22)  WBC Count: 4.20 K/uL (06-21 @ 09:10)    06-25    142  |  110<H>  |  8   ----------------------------<  88  3.4<L>   |  25  |  0.61    Ca    9.0      25 Jun 2022 08:25  Phos  3.2     06-24  Mg     2.0     06-24        CULTURES:   Clean Catch Clean Catch (Midstream)  06-20 @ 10:24   >=3 organisms. Probable collection contamination.  --  --        RADIOLOGY:  no new studies

## 2022-06-25 NOTE — PROGRESS NOTE ADULT - PROBLEM SELECTOR PLAN 3
- pt pmh of afib, on home med eliquis, amiodarone and metoprolol  - continue home med amiodarone, metoprolol  - eliquis d/c pending colonoscopy 6/24/22  - Resume eliquis 6/25/22- GI recc  - HR <100  - controlled

## 2022-06-25 NOTE — PROGRESS NOTE ADULT - PROBLEM SELECTOR PLAN 1
- Patient presented with LLQ abdominal pain then spread to all over abdomen   - CT abd/pelv shows possible neoplasm in distal ascending colon  - initially suspected colitis, s/p Cipro and Flagyl  - continue IV fluids  - continue Pain meds  - GI Dr. Jernigan   - ID Dr. Magaña

## 2022-06-25 NOTE — PROGRESS NOTE ADULT - ASSESSMENT
Chronic Osteomyelitis of Sacrum/ Coccyx region  No Evidence of infectious Colitis at this time    Plan:  ·	Given Osteo is Chronic there is no need to treat for it, will monitor off antibiotics.

## 2022-06-26 LAB
ANION GAP SERPL CALC-SCNC: 11 MMOL/L — SIGNIFICANT CHANGE UP (ref 5–17)
BUN SERPL-MCNC: 12 MG/DL — SIGNIFICANT CHANGE UP (ref 7–18)
CALCIUM SERPL-MCNC: 9 MG/DL — SIGNIFICANT CHANGE UP (ref 8.4–10.5)
CHLORIDE SERPL-SCNC: 109 MMOL/L — HIGH (ref 96–108)
CO2 SERPL-SCNC: 23 MMOL/L — SIGNIFICANT CHANGE UP (ref 22–31)
CREAT SERPL-MCNC: 0.79 MG/DL — SIGNIFICANT CHANGE UP (ref 0.5–1.3)
EGFR: 86 ML/MIN/1.73M2 — SIGNIFICANT CHANGE UP
GLUCOSE SERPL-MCNC: 132 MG/DL — HIGH (ref 70–99)
HCT VFR BLD CALC: 36.8 % — SIGNIFICANT CHANGE UP (ref 34.5–45)
HGB BLD-MCNC: 12.2 G/DL — SIGNIFICANT CHANGE UP (ref 11.5–15.5)
MAGNESIUM SERPL-MCNC: 2.1 MG/DL — SIGNIFICANT CHANGE UP (ref 1.6–2.6)
MCHC RBC-ENTMCNC: 31.5 PG — SIGNIFICANT CHANGE UP (ref 27–34)
MCHC RBC-ENTMCNC: 33.2 GM/DL — SIGNIFICANT CHANGE UP (ref 32–36)
MCV RBC AUTO: 95.1 FL — SIGNIFICANT CHANGE UP (ref 80–100)
NRBC # BLD: 0 /100 WBCS — SIGNIFICANT CHANGE UP (ref 0–0)
PHOSPHATE SERPL-MCNC: 2.9 MG/DL — SIGNIFICANT CHANGE UP (ref 2.5–4.5)
PLATELET # BLD AUTO: 158 K/UL — SIGNIFICANT CHANGE UP (ref 150–400)
POTASSIUM SERPL-MCNC: 3.4 MMOL/L — LOW (ref 3.5–5.3)
POTASSIUM SERPL-SCNC: 3.4 MMOL/L — LOW (ref 3.5–5.3)
RBC # BLD: 3.87 M/UL — SIGNIFICANT CHANGE UP (ref 3.8–5.2)
RBC # FLD: 13.3 % — SIGNIFICANT CHANGE UP (ref 10.3–14.5)
SODIUM SERPL-SCNC: 143 MMOL/L — SIGNIFICANT CHANGE UP (ref 135–145)
WBC # BLD: 3.34 K/UL — LOW (ref 3.8–10.5)
WBC # FLD AUTO: 3.34 K/UL — LOW (ref 3.8–10.5)

## 2022-06-26 RX ORDER — POTASSIUM CHLORIDE 20 MEQ
40 PACKET (EA) ORAL ONCE
Refills: 0 | Status: COMPLETED | OUTPATIENT
Start: 2022-06-26 | End: 2022-06-26

## 2022-06-26 RX ADMIN — SIMVASTATIN 10 MILLIGRAM(S): 20 TABLET, FILM COATED ORAL at 21:49

## 2022-06-26 RX ADMIN — APIXABAN 5 MILLIGRAM(S): 2.5 TABLET, FILM COATED ORAL at 17:36

## 2022-06-26 RX ADMIN — GABAPENTIN 100 MILLIGRAM(S): 400 CAPSULE ORAL at 13:57

## 2022-06-26 RX ADMIN — Medication 40 MILLIEQUIVALENT(S): at 12:32

## 2022-06-26 RX ADMIN — Medication 500 MILLIGRAM(S): at 12:32

## 2022-06-26 RX ADMIN — POLYETHYLENE GLYCOL 3350 17 GRAM(S): 17 POWDER, FOR SOLUTION ORAL at 12:32

## 2022-06-26 RX ADMIN — APIXABAN 5 MILLIGRAM(S): 2.5 TABLET, FILM COATED ORAL at 06:28

## 2022-06-26 RX ADMIN — Medication 10 MILLIGRAM(S): at 21:49

## 2022-06-26 RX ADMIN — Medication 10 MILLIGRAM(S): at 06:28

## 2022-06-26 RX ADMIN — Medication 2000 UNIT(S): at 12:32

## 2022-06-26 RX ADMIN — GABAPENTIN 100 MILLIGRAM(S): 400 CAPSULE ORAL at 06:28

## 2022-06-26 RX ADMIN — GABAPENTIN 100 MILLIGRAM(S): 400 CAPSULE ORAL at 21:49

## 2022-06-26 NOTE — PROGRESS NOTE ADULT - SUBJECTIVE AND OBJECTIVE BOX
62y Female is under our care for chronic osteomyelitis of sacrum/ coccyx region. Patient is stable with no overnight events. Still c/o gassiness at times, no diarrhea. Remains afebrile with normal wbc count. Remains off antibiotics at this time.    MEDS: no antibiotics    ALLERGIES: Allergies    No Known Allergies    Intolerances    sugar (Rash)    REVIEW OF SYSTEMS:  [  ] Not able to illicit  General: no fevers no malaise  Chest: no cough no sob  GI: no nvd +gassiness  : no urinary sxs   Skin: no rashes  Musculoskeletal: no trauma no LBP  Neuro: no ha's no dizziness 	    VITALS:  Vital Signs Last 24 Hrs  T(C): 37.1 (26 Jun 2022 13:27), Max: 37.1 (26 Jun 2022 13:27)  T(F): 98.8 (26 Jun 2022 13:27), Max: 98.8 (26 Jun 2022 13:27)  HR: 67 (26 Jun 2022 13:27) (57 - 67)  BP: 109/55 (26 Jun 2022 13:27) (102/55 - 109/55)  BP(mean): --  RR: 18 (26 Jun 2022 13:27) (18 - 20)  SpO2: 99% (26 Jun 2022 13:27) (96% - 100%)    PHYSICAL EXAM:  HEENT: n/a  Neck: supple no LN's   Respiratory: lungs clear no rales  Cardiovascular: S1 S2 reg no murmurs  Gastrointestinal: +BS with soft, nondistended abdomen; nontender  : +permafit cath  Extremities: no edema, right arm atrophy and contracture  Skin: no rashes  Ortho: n/a  Neuro: AAO x 3, right hemiplegia      LABS/DIAGNOSTIC TESTS:                               12.2   3.34  )-----------( 158      ( 26 Jun 2022 09:37 )             36.8   WBC Count: 3.34 K/uL (06-26 @ 09:37)  WBC Count: 3.51 K/uL (06-25 @ 08:25)  WBC Count: 3.04 K/uL (06-24 @ 09:25)  WBC Count: 3.03 K/uL (06-23 @ 09:27)  WBC Count: 3.13 K/uL (06-22 @ 08:22)    06-26    143  |  109<H>  |  12  ----------------------------<  132<H>  3.4<L>   |  23  |  0.79    Ca    9.0      26 Jun 2022 09:37  Phos  2.9     06-26  Mg     2.1     06-26        CULTURES:   Clean Catch Clean Catch (Midstream)  06-20 @ 10:24   >=3 organisms. Probable collection contamination.  --  --        RADIOLOGY:  no new studies

## 2022-06-26 NOTE — PROGRESS NOTE ADULT - NS ATTEND AMEND GEN_ALL_CORE FT
to continue present management  Discharge planning.
I agree with above
I agree with above
d/w NP  for colonoscopy.
colonoscopy in am, since eliquis on hold from today.
for colonoscopy tomorrow.  Case management, future plan d/w Son-Danny on phone-[phone].

## 2022-06-26 NOTE — PROGRESS NOTE ADULT - ASSESSMENT
Chronic Osteomyelitis of Sacrum/ Coccyx region  No Evidence of infectious Colitis at this time    Plan:  ·	remain off antibiotics, no need for further antibiotic treatment   ·	reconsult prn

## 2022-06-26 NOTE — PROGRESS NOTE ADULT - ASSESSMENT
1. Abdominal pain  2. Wall thickening of distal ascending colon  3. S/p colonoscopy  4. Colitis / Proctitis  5. Fecal impaction     Suggestions:    1. Diet as tolerated  2. Daily stool softener  3. Avoid NSAID  4. Protonix daily  5. Follow up path  6. Resume Eliquis  7. DVT prophylaxis

## 2022-06-26 NOTE — PROGRESS NOTE ADULT - SUBJECTIVE AND OBJECTIVE BOX
[   ] ICU                                          [   ] CCU                                      [  X ] Medical Floor    Patient is a 62 year old female with abdominal pain. GI consulted to evaluate.         Patient is 62 years old female from [facility] with past medical history significant for CVA with hemiplegia/aphasia/dysphagia, HTN, Afib on Eliquis, HLD, presents with 4 days history of intermittent, crampy 6-7/10 intensity LLQ abdominal pain radiating diffusely associated with bloating. Patient denies nausea, vomiting, hematemesis, hematochezia, melena, fever, chills, chest pain, SOB, cough, hematuria, dysuria or diarrhea.      Patient appears comfortable. No new complaints reported, No abdominal pain, nausea, vomiting, hematemesis, hematochezia, melena, fever, chills, chest pain, SOB, cough or diarrhea reported.      PAIN MANAGEMENT:  Pain Scale:                 0/10  Pain Location:        Prior Colonoscopy:  Unknown      PAST MEDICAL HISTORY  Atrial fibrillation  CVA (cerebrovascular accident)  HTN  Hyperlipidemia  Hemiplegia  Dysphagia  Aphagia      PAST SURGICAL HISTORY  Tubal ligation  Thoracic aortic aneurysm repair  Hammer toe correction        Allergies    No Known Allergies    Intolerances  sugar (Rash)         MEDICATIONS  (STANDING):  aMIOdarone    Tablet 100 milliGRAM(s) Oral daily  apixaban 5 milliGRAM(s) Oral every 12 hours  ascorbic acid 500 milliGRAM(s) Oral daily  baclofen 10 milliGRAM(s) Oral every 8 hours  cholecalciferol 2000 Unit(s) Oral daily  ciprofloxacin   IVPB 400 milliGRAM(s) IV Intermittent every 12 hours  gabapentin 100 milliGRAM(s) Oral three times a day  influenza   Vaccine 0.5 milliLiter(s) IntraMuscular once  metoprolol succinate ER 25 milliGRAM(s) Oral daily  metroNIDAZOLE    Tablet 500 milliGRAM(s) Oral every 8 hours  simvastatin 10 milliGRAM(s) Oral at bedtime  sodium chloride 0.9%. 1000 milliLiter(s) (80 mL/Hr) IV Continuous <Continuous>    MEDICATIONS  (PRN):  acetaminophen     Tablet .. 650 milliGRAM(s) Oral every 6 hours PRN Mild Pain (1 - 3)      SOCIAL HISTORY  Advanced Directives:       [ X ] Full Code       [  ] DNR  Marital Status:         [  ] M      [ X ] S      [  ] D       [  ] W  Children:       [ X ] Yes      [  ] No  Occupation:        [  ] Employed       [ X ] Unemployed       [  ] Retired  Diet:       [ X ] Regular       [  ] PEG feeding          [  ] NG tube feeding  Drug Use:           [ X ] Patient denied          [  ] Yes  Alcohol:           [X  ] No             [  ] Yes (socially)         [  ] Yes (chronic)  Tobacco:           [  ] Yes           [ X ] No      FAMILY HISTORY  [ X ] Heart Disease            [ X ] Diabetes             [ X ] HTN             [  ] Colon Cancer             [  ] Stomach Cancer              [  ] Pancreatic Cancer      VITALS  Vital Signs Last 24 Hrs  T(C): 36.4 (26 Jun 2022 06:32), Max: 36.7 (25 Jun 2022 12:00)  T(F): 97.6 (26 Jun 2022 06:32), Max: 98 (25 Jun 2022 12:00)  HR: 57 (26 Jun 2022 06:32) (57 - 64)  BP: 102/55 (26 Jun 2022 06:32) (101/49 - 106/58)   RR: 19 (26 Jun 2022 06:32) (18 - 20)  SpO2: 100% (26 Jun 2022 06:32) (96% - 100%)       MEDICATIONS  (STANDING):  aMIOdarone    Tablet 100 milliGRAM(s) Oral daily  apixaban 5 milliGRAM(s) Oral every 12 hours  ascorbic acid 500 milliGRAM(s) Oral daily  baclofen 10 milliGRAM(s) Oral every 8 hours  cholecalciferol 2000 Unit(s) Oral daily  gabapentin 100 milliGRAM(s) Oral three times a day  influenza   Vaccine 0.5 milliLiter(s) IntraMuscular once  metoprolol succinate ER 25 milliGRAM(s) Oral daily  polyethylene glycol 3350 17 Gram(s) Oral daily  potassium chloride   Powder 40 milliEquivalent(s) Oral once  simvastatin 10 milliGRAM(s) Oral at bedtime    MEDICATIONS  (PRN):  acetaminophen     Tablet .. 650 milliGRAM(s) Oral every 6 hours PRN Mild Pain (1 - 3)                            12.2   3.34  )-----------( 158      ( 26 Jun 2022 09:37 )             36.8       06-26    143  |  109<H>  |  12  ----------------------------<  132<H>  3.4<L>   |  23  |  0.79    Ca    9.0      26 Jun 2022 09:37  Phos  2.9     06-26  Mg     2.1     06-26

## 2022-06-27 ENCOUNTER — TRANSCRIPTION ENCOUNTER (OUTPATIENT)
Age: 62
End: 2022-06-27

## 2022-06-27 VITALS
SYSTOLIC BLOOD PRESSURE: 98 MMHG | OXYGEN SATURATION: 99 % | TEMPERATURE: 98 F | DIASTOLIC BLOOD PRESSURE: 49 MMHG | HEART RATE: 68 BPM | RESPIRATION RATE: 17 BRPM

## 2022-06-27 LAB
ANION GAP SERPL CALC-SCNC: 10 MMOL/L — SIGNIFICANT CHANGE UP (ref 5–17)
BUN SERPL-MCNC: 10 MG/DL — SIGNIFICANT CHANGE UP (ref 7–18)
CALCIUM SERPL-MCNC: 9 MG/DL — SIGNIFICANT CHANGE UP (ref 8.4–10.5)
CHLORIDE SERPL-SCNC: 108 MMOL/L — SIGNIFICANT CHANGE UP (ref 96–108)
CO2 SERPL-SCNC: 24 MMOL/L — SIGNIFICANT CHANGE UP (ref 22–31)
CREAT SERPL-MCNC: 0.65 MG/DL — SIGNIFICANT CHANGE UP (ref 0.5–1.3)
EGFR: 101 ML/MIN/1.73M2 — SIGNIFICANT CHANGE UP
GLUCOSE SERPL-MCNC: 110 MG/DL — HIGH (ref 70–99)
HCT VFR BLD CALC: 38.2 % — SIGNIFICANT CHANGE UP (ref 34.5–45)
HGB BLD-MCNC: 12.4 G/DL — SIGNIFICANT CHANGE UP (ref 11.5–15.5)
MCHC RBC-ENTMCNC: 31.6 PG — SIGNIFICANT CHANGE UP (ref 27–34)
MCHC RBC-ENTMCNC: 32.5 GM/DL — SIGNIFICANT CHANGE UP (ref 32–36)
MCV RBC AUTO: 97.4 FL — SIGNIFICANT CHANGE UP (ref 80–100)
NRBC # BLD: 0 /100 WBCS — SIGNIFICANT CHANGE UP (ref 0–0)
PLATELET # BLD AUTO: 165 K/UL — SIGNIFICANT CHANGE UP (ref 150–400)
POTASSIUM SERPL-MCNC: 3.4 MMOL/L — LOW (ref 3.5–5.3)
POTASSIUM SERPL-SCNC: 3.4 MMOL/L — LOW (ref 3.5–5.3)
RBC # BLD: 3.92 M/UL — SIGNIFICANT CHANGE UP (ref 3.8–5.2)
RBC # FLD: 13.5 % — SIGNIFICANT CHANGE UP (ref 10.3–14.5)
SARS-COV-2 RNA SPEC QL NAA+PROBE: SIGNIFICANT CHANGE UP
SODIUM SERPL-SCNC: 142 MMOL/L — SIGNIFICANT CHANGE UP (ref 135–145)
WBC # BLD: 3.01 K/UL — LOW (ref 3.8–10.5)
WBC # FLD AUTO: 3.01 K/UL — LOW (ref 3.8–10.5)

## 2022-06-27 PROCEDURE — 87640 STAPH A DNA AMP PROBE: CPT

## 2022-06-27 PROCEDURE — 87086 URINE CULTURE/COLONY COUNT: CPT

## 2022-06-27 PROCEDURE — 81001 URINALYSIS AUTO W/SCOPE: CPT

## 2022-06-27 PROCEDURE — 86703 HIV-1/HIV-2 1 RESULT ANTBDY: CPT

## 2022-06-27 PROCEDURE — 36415 COLL VENOUS BLD VENIPUNCTURE: CPT

## 2022-06-27 PROCEDURE — 93005 ELECTROCARDIOGRAM TRACING: CPT

## 2022-06-27 PROCEDURE — 74177 CT ABD & PELVIS W/CONTRAST: CPT | Mod: MA

## 2022-06-27 PROCEDURE — 84100 ASSAY OF PHOSPHORUS: CPT

## 2022-06-27 PROCEDURE — 85025 COMPLETE CBC W/AUTO DIFF WBC: CPT

## 2022-06-27 PROCEDURE — 85610 PROTHROMBIN TIME: CPT

## 2022-06-27 PROCEDURE — 80053 COMPREHEN METABOLIC PANEL: CPT

## 2022-06-27 PROCEDURE — 87641 MR-STAPH DNA AMP PROBE: CPT

## 2022-06-27 PROCEDURE — 80061 LIPID PANEL: CPT

## 2022-06-27 PROCEDURE — 87635 SARS-COV-2 COVID-19 AMP PRB: CPT

## 2022-06-27 PROCEDURE — 99285 EMERGENCY DEPT VISIT HI MDM: CPT | Mod: 25

## 2022-06-27 PROCEDURE — 85027 COMPLETE CBC AUTOMATED: CPT

## 2022-06-27 PROCEDURE — 88305 TISSUE EXAM BY PATHOLOGIST: CPT

## 2022-06-27 PROCEDURE — 83735 ASSAY OF MAGNESIUM: CPT

## 2022-06-27 PROCEDURE — 80048 BASIC METABOLIC PNL TOTAL CA: CPT

## 2022-06-27 PROCEDURE — 92610 EVALUATE SWALLOWING FUNCTION: CPT

## 2022-06-27 PROCEDURE — 86803 HEPATITIS C AB TEST: CPT

## 2022-06-27 RX ORDER — APIXABAN 2.5 MG/1
1 TABLET, FILM COATED ORAL
Qty: 0 | Refills: 0 | DISCHARGE
Start: 2022-06-27

## 2022-06-27 RX ORDER — POLYETHYLENE GLYCOL 3350 17 G/17G
17 POWDER, FOR SOLUTION ORAL
Qty: 0 | Refills: 0 | DISCHARGE
Start: 2022-06-27

## 2022-06-27 RX ORDER — APIXABAN 2.5 MG/1
1 TABLET, FILM COATED ORAL
Qty: 0 | Refills: 0 | DISCHARGE

## 2022-06-27 RX ORDER — SENNA PLUS 8.6 MG/1
2 TABLET ORAL AT BEDTIME
Refills: 0 | Status: DISCONTINUED | OUTPATIENT
Start: 2022-06-27 | End: 2022-06-27

## 2022-06-27 RX ORDER — SIMVASTATIN 20 MG/1
1 TABLET, FILM COATED ORAL
Qty: 0 | Refills: 0 | DISCHARGE
Start: 2022-06-27

## 2022-06-27 RX ORDER — SENNA PLUS 8.6 MG/1
2 TABLET ORAL
Qty: 0 | Refills: 0 | DISCHARGE
Start: 2022-06-27

## 2022-06-27 RX ORDER — SODIUM CHLORIDE 9 MG/ML
500 INJECTION INTRAMUSCULAR; INTRAVENOUS; SUBCUTANEOUS ONCE
Refills: 0 | Status: COMPLETED | OUTPATIENT
Start: 2022-06-27 | End: 2022-06-27

## 2022-06-27 RX ADMIN — Medication 10 MILLIGRAM(S): at 06:50

## 2022-06-27 RX ADMIN — GABAPENTIN 100 MILLIGRAM(S): 400 CAPSULE ORAL at 13:11

## 2022-06-27 RX ADMIN — GABAPENTIN 100 MILLIGRAM(S): 400 CAPSULE ORAL at 06:49

## 2022-06-27 RX ADMIN — APIXABAN 5 MILLIGRAM(S): 2.5 TABLET, FILM COATED ORAL at 18:08

## 2022-06-27 RX ADMIN — SENNA PLUS 2 TABLET(S): 8.6 TABLET ORAL at 21:26

## 2022-06-27 RX ADMIN — APIXABAN 5 MILLIGRAM(S): 2.5 TABLET, FILM COATED ORAL at 06:50

## 2022-06-27 RX ADMIN — Medication 2000 UNIT(S): at 12:35

## 2022-06-27 RX ADMIN — SODIUM CHLORIDE 500 MILLILITER(S): 9 INJECTION INTRAMUSCULAR; INTRAVENOUS; SUBCUTANEOUS at 10:18

## 2022-06-27 RX ADMIN — SIMVASTATIN 10 MILLIGRAM(S): 20 TABLET, FILM COATED ORAL at 21:27

## 2022-06-27 RX ADMIN — Medication 10 MILLIGRAM(S): at 21:27

## 2022-06-27 RX ADMIN — POLYETHYLENE GLYCOL 3350 17 GRAM(S): 17 POWDER, FOR SOLUTION ORAL at 12:36

## 2022-06-27 RX ADMIN — Medication 10 MILLIGRAM(S): at 13:12

## 2022-06-27 RX ADMIN — GABAPENTIN 100 MILLIGRAM(S): 400 CAPSULE ORAL at 21:27

## 2022-06-27 RX ADMIN — Medication 500 MILLIGRAM(S): at 12:35

## 2022-06-27 NOTE — PROGRESS NOTE ADULT - RESPIRATORY
clear to auscultation bilaterally/no wheezes/no rales/no rhonchi/no respiratory distress/no use of accessory muscles/no subcutaneous emphysema/airway patent

## 2022-06-27 NOTE — PROGRESS NOTE ADULT - REASON FOR ADMISSION
Abdominal pain
Abdominal pain
[facility]  [address] [place] [address]  Phone: [phone]  Fax: [phone]    Rajeev Rodas MD    July 11, 2021     Ana Crawford MD  [address] [place] [address]    Patient: Florence Rodas   MR Number: <[id number]>   YOB: 1996   Date of Visit: 7/9/2021       Dear Ana Crawford:    Thank you for referring Florence Rodas to me for evaluation/treatment. Below are the relevant portions of my assessment and plan of care. If you have questions, please do not hesitate to call me. I look forward to following Yudi Patterson along with you.     Sincerely,        Rajeev Rodas MD
Abdominal pain

## 2022-06-27 NOTE — PROGRESS NOTE ADULT - MUSCULOSKELETAL
He comes in today for interval follow up.  In general he is doing well.  He is compliant with his mercaptopurine and has no GI complaints.  He has recently noted significant weight gain despite regular exercise.  His colonoscopy in February was notable for no evidence of Crohn's on biopsy. no joint swelling/no joint warmth/no calf tenderness details…

## 2022-06-27 NOTE — PROGRESS NOTE ADULT - PROVIDER SPECIALTY LIST ADULT
Infectious Disease
Infectious Disease
Internal Medicine
Gastroenterology
Internal Medicine
Internal Medicine
Gastroenterology
Gastroenterology
Internal Medicine
Internal Medicine
Gastroenterology

## 2022-06-27 NOTE — PROGRESS NOTE ADULT - SUBJECTIVE AND OBJECTIVE BOX
[   ] ICU                                          [   ] CCU                                      [ X  ] Medical Floor    Patient is a 62 year old female with abdominal pain. GI consulted to evaluate.         Patient is 62 years old female from [facility] with past medical history significant for CVA with hemiplegia/aphasia/dysphagia, HTN, Afib on Eliquis, HLD, presents with 4 days history of intermittent, crampy 6-7/10 intensity LLQ abdominal pain radiating diffusely associated with bloating. Patient denies nausea, vomiting, hematemesis, hematochezia, melena, fever, chills, chest pain, SOB, cough, hematuria, dysuria or diarrhea.      Patient appears comfortable. No new complaints reported, No abdominal pain, nausea, vomiting, hematemesis, hematochezia, melena, fever, chills, chest pain, SOB, cough or diarrhea reported.      PAIN MANAGEMENT:  Pain Scale:                 0/10  Pain Location:        Prior Colonoscopy:  Unknown      PAST MEDICAL HISTORY  Atrial fibrillation  CVA (cerebrovascular accident)  HTN  Hyperlipidemia  Hemiplegia  Dysphagia  Aphagia      PAST SURGICAL HISTORY  Tubal ligation  Thoracic aortic aneurysm repair  Hammer toe correction        Allergies    No Known Allergies    Intolerances  sugar (Rash)       SOCIAL HISTORY  Advanced Directives:       [ X ] Full Code       [  ] DNR  Marital Status:         [  ] M      [ X ] S      [  ] D       [  ] W  Children:       [ X ] Yes      [  ] No  Occupation:        [  ] Employed       [ X ] Unemployed       [  ] Retired  Diet:       [ X ] Regular       [  ] PEG feeding          [  ] NG tube feeding  Drug Use:           [ X ] Patient denied          [  ] Yes  Alcohol:           [X  ] No             [  ] Yes (socially)         [  ] Yes (chronic)  Tobacco:           [  ] Yes           [ X ] No      FAMILY HISTORY  [ X ] Heart Disease            [ X ] Diabetes             [ X ] HTN             [  ] Colon Cancer             [  ] Stomach Cancer              [  ] Pancreatic Cancer      VITALS  Vital Signs Last 24 Hrs  T(C): 36.6 (27 Jun 2022 06:05), Max: 37.1 (26 Jun 2022 13:27)  T(F): 97.8 (27 Jun 2022 06:05), Max: 98.8 (26 Jun 2022 13:27)  HR: 67 (27 Jun 2022 06:05) (67 - 70)  BP: 99/46 (27 Jun 2022 06:05) (86/49 - 109/55)   RR: 18 (27 Jun 2022 06:05) (17 - 18)  SpO2: 97% (27 Jun 2022 06:05) (95% - 99%)       MEDICATIONS  (STANDING):  aMIOdarone    Tablet 100 milliGRAM(s) Oral daily  apixaban 5 milliGRAM(s) Oral every 12 hours  ascorbic acid 500 milliGRAM(s) Oral daily  baclofen 10 milliGRAM(s) Oral every 8 hours  cholecalciferol 2000 Unit(s) Oral daily  gabapentin 100 milliGRAM(s) Oral three times a day  influenza   Vaccine 0.5 milliLiter(s) IntraMuscular once  metoprolol succinate ER 25 milliGRAM(s) Oral daily  polyethylene glycol 3350 17 Gram(s) Oral daily  simvastatin 10 milliGRAM(s) Oral at bedtime  sodium chloride 0.9% Bolus 500 milliLiter(s) IV Bolus once    MEDICATIONS  (PRN):  acetaminophen     Tablet .. 650 milliGRAM(s) Oral every 6 hours PRN Mild Pain (1 - 3)                            12.4   3.01  )-----------( 165      ( 27 Jun 2022 09:16 )             38.2       06-27    142  |  108  |  10  ----------------------------<  110<H>  3.4<L>   |  24  |  0.65    Ca    9.0      27 Jun 2022 09:16  Phos  2.9     06-26  Mg     2.1     06-26

## 2022-06-27 NOTE — DISCHARGE NOTE PROVIDER - CARE PROVIDER_API CALL
Jaime Mcmullen)  Internal Medicine  [address] [place] [address]  Phone: [phone]  Fax: [phone]  Follow Up Time: 1 week    OPAL VELAZQUEZ  Internal Medicine  [address] [place] [address]  Phone: [phone]  Fax: [phone]  Follow Up Time: 1 week

## 2022-06-27 NOTE — PROGRESS NOTE ADULT - NEGATIVE MUSCULOSKELETAL SYMPTOMS
no muscle cramps/no muscle weakness/no stiffness/no neck pain/no arm pain L/no arm pain R/no back pain/no leg pain L/no leg pain R

## 2022-06-27 NOTE — PROGRESS NOTE ADULT - NEGATIVE GASTROINTESTINAL SYMPTOMS
no nausea/no vomiting/no diarrhea/no melena/no hematochezia/no steatorrhea/no jaundice

## 2022-06-27 NOTE — PROGRESS NOTE ADULT - NEGATIVE SKIN SYMPTOMS
no rash/no itching/no dryness/no brittle nails/no pitted nails/no hair loss

## 2022-06-27 NOTE — DISCHARGE NOTE PROVIDER - PROVIDER TOKENS
PROVIDER:[TOKEN:[[id number]],FOLLOWUP:[1 week]],PROVIDER:[TOKEN:[[id number]],FOLLOWUP:[1 week]]

## 2022-06-27 NOTE — DISCHARGE NOTE PROVIDER - HOSPITAL COURSE
62 year old female from  [facility] with past medical history CVA with hemiplegia/aphasia/dysphagia, HTN, Afib on Eliquis, HLD, presents with abdominal pain. CT A/P found concern for neoplasm in distal ascending colon, GI Thereseyan consulted. CT also noted possible acute vs chronic osteomyelitis in sacrum. Pt had unstageable sacral ulcer for 1 year, HERNAN hauser followed and advised to monitor off abx.   S/P colonoscopy 6/24/22 which found polyps, diverticulosis, and internal hemorrhoids. Given clinical course decision made to discharge patient with outpatient follow up   Discharge discussed with attending   This is only a brief summary of patient's hospital stay, for full course please see EMR

## 2022-06-27 NOTE — CHART NOTE - NSCHARTNOTEFT_GEN_A_CORE
EVENT:   6/25/22, 4:30am, patient with Afib on Eliquis had colonoscopy on 6/24/22. Eliquis - Held. GI Dr. Jernigan. According to Jaime Núñez, because of pt.'s chronic Afib, Eliquis should be resumed on 6/25/22- GI recc.   HPI:      61 y/o female from [facility] with past medical history CVA with hemiplegia/aphasia/dysphagia, HTN, Afib on Eliquis, HLD, presents with abdominal pain. Patient states that pain started 4 days ago. it started on LLQ then spread to all her abdomen. Patient denied any change in bowel movement. Her last bowel movement was last night and it was normal. Patient denied any bleeding in stool, chest pain, breathing problem, Nausea or vomiting. (20 Jun 2022 10:30)  OBJECTIVE:  Vital Signs Last 24 Hrs  T(C): 36.7 (24 Jun 2022 20:44), Max: 36.7 (24 Jun 2022 20:44)  T(F): 98.1 (24 Jun 2022 20:44), Max: 98.1 (24 Jun 2022 20:44)  HR: 58 (24 Jun 2022 20:44) (46 - 67)  BP: 98/54 (24 Jun 2022 20:44) (98/54 - 113/60)  BP(mean): --  RR: 20 (24 Jun 2022 20:44) (15 - 20)  SpO2: 98% (24 Jun 2022 20:44) (98% - 100%)    FOCUSED PHYSICAL EXAM:    LABS:                        12.5   3.04  )-----------( 166      ( 24 Jun 2022 09:25 )             38.1     06-24    142  |  110<H>  |  5<L>  ----------------------------<  85  3.7   |  27  |  0.63    Ca    8.9      24 Jun 2022 09:25  Phos  3.2     06-24  Mg     2.0     06-24    TPro  7.5  /  Alb  3.1<L>  /  TBili  1.0  /  DBili  x   /  AST  12  /  ALT  19  /  AlkPhos  109  06-23    PLAN:   - Apixaban (Eliquis) 5 mg po Q12 hrs. (indication: Non-valvular atrial fibrillation).     FOLLOW UP / RESULT:   - Reassess patient for bleeding,   - Follow-up morning labs,   - Maintain safety measures.
Spoke to abel SARAH today, updated on clinical status, treatment plan/options and discharge plan/options, all questions answered
Discharge planning d/w NP.  [facility]

## 2022-06-27 NOTE — DISCHARGE NOTE NURSING/CASE MANAGEMENT/SOCIAL WORK - NSDCPEFALRISK_GEN_ALL_CORE
For information on Fall & Injury Prevention, visit: [email or web address] OR  [email or web address] OR  https://www.cdc.gov/steadi/patient.html

## 2022-06-27 NOTE — PROGRESS NOTE ADULT - NEGATIVE GENERAL SYMPTOMS
no fever/no chills/no sweating/no anorexia/no polyphagia/no polyuria/no polydipsia

## 2022-06-27 NOTE — DISCHARGE NOTE NURSING/CASE MANAGEMENT/SOCIAL WORK - PATIENT PORTAL LINK FT
You can access the FollowMyHealth Patient Portal offered by [facility] by registering at the following website: [email or web address]. By joining [facility]’s FollowMyHealth portal, you will also be able to view your health information using other applications (apps) compatible with our system.

## 2022-06-27 NOTE — PROGRESS NOTE ADULT - NEGATIVE NEUROLOGICAL SYMPTOMS
no syncope/no tremors/no vertigo/no loss of sensation/no difficulty walking/no headache/no loss of consciousness

## 2022-06-27 NOTE — DISCHARGE NOTE PROVIDER - NSDCCPCAREPLAN_GEN_ALL_CORE_FT
PRINCIPAL DISCHARGE DIAGNOSIS  Diagnosis: Sacral osteomyelitis  Assessment and Plan of Treatment: Osteomyelitis is a severe bone infection. It can develop in any bone, but often involves the long bones, such as your arm and leg bones, or the bones of the spine. You were managed by the infectious disease specialist and managed off antibiotics as this appears to be chronic. It is important to keep the wound clean and dry. Follow up with your primary care doctor      SECONDARY DISCHARGE DIAGNOSES  Diagnosis: Constipation  Assessment and Plan of Treatment: Constipation means you have hard, dry bowel movements, or you go longer than usual between bowel movements. Drink liquids as directed. You may need to drink extra liquids to help soften and move your bowels. Ask how much liquid to drink each day and which liquids are best for you. Eat high-fiber foods. This may help decrease constipation by adding bulk to your bowel movements. High-fiber foods include fruit, vegetables, whole-grain breads and cereals, and beans. Your healthcare provider or dietitian can help you create a high-fiber meal plan. Your provider may also recommend a fiber supplement if you cannot get enough fiber from food. Schedule a time each day to have a bowel movement. This may help train your body to have regular bowel movements. Bend forward while you are on the toilet to help move the bowel movement out. Sit on the toilet for at least 10 minutes, even if you do not have a bowel movement.  Talk to your healthcare provider about your medicines. Certain medicines, such as opioids, can cause constipation. Your provider may be able to make medicine changes    Diagnosis: CVA (cerebrovascular accident)  Assessment and Plan of Treatment: You have a history of a stroke, it is important to, continue medications as ordered. A stroke is a brain attack that occurs when an artery or a blood vessel becomes occluded breaks, interrupting blood flow to an area of the brain cells begin to die. Please call 911 for facial droop slurring speech, unable to move a limb or sudden weakness in one limb or one side of the body or confusion.    Diagnosis: Chronic atrial fibrillation  Assessment and Plan of Treatment: Atrial fibrillation is the most common heart rhythm problem.  The condition puts you at risk for has stroke and heart attack  It helps if you control your blood pressure, not drink more than 1-2 alcohol drinks per day, cut down on caffeine, getting treatment for over active thyroid gland, and get regular exercise  Call your doctor if you feel your heart racing or beating unusually, chest tightness or pain, lightheaded, faint, shortness of breath especially with exercise  It is important to take your heart medication as prescribed  You may be on anticoagulation which is very important to take as directed - you may need blood work to monitor drug levels      Diagnosis: Hypertension  Assessment and Plan of Treatment: You have a history of hypertension, it is important to continue Low salt diet  Activity as tolerated.  Take all medication as prescribed.  Follow up with your medical doctor for routine blood pressure monitoring at your next visit.  Notify your doctor if you have any of the following symptoms:   Dizziness, Lightheadedness, Blurry vision, Headache, Chest pain, Shortness of breath      Diagnosis: Hyperlipidemia  Assessment and Plan of Treatment: Follow up with PCP for treatment goals, continue medication, have liver function testing every 3 months as anti lipid medications can cause liver irritation, eat low fat, low cholesterol meals

## 2022-06-28 LAB — SURGICAL PATHOLOGY STUDY: SIGNIFICANT CHANGE UP

## 2022-10-14 NOTE — PATIENT PROFILE ADULT - CENTRAL VENOUS CATHETER/PICC LINE
-- DO NOT REPLY / DO NOT REPLY ALL --  -- Message is from [facility] ([facility]) --    General Patient Message : Belinda is calling because the patient is scheduled for cataract surgery on Monday 10/17/2022 but the H&P that was faxed over is not legible. Belinda is requesting for it to be re-faxed over to: [phone]. Please fax as soon as possible.     Caller Information       Type Contact Phone/Fax    10/14/2022 01:40 PM CDT Phone (Incoming) Belinda [phone]     Tasted Menu        Alternative phone number: none    Can a detailed message be left? Yes    Message Turnaround:     Is it Working Hours? Yes - Working Hours     [place]:    Please give this turnaround time to the caller:   \"This message will be sent to [state Provider's name]. The clinical team will fulfill your request as soon as they review your message.\"                 no

## 2023-01-03 NOTE — DIETITIAN INITIAL EVALUATION ADULT - WEIGHT (LBS)
89 Partially impaired: cannot see medication labels or newsprint, but can see obstacles in path, and the surrounding layout; can count fingers at arm's length

## 2024-11-18 NOTE — ASU DISCHARGE PLAN (ADULT/PEDIATRIC) - "IF YOU OR YOUR GUARDIAN/FAMILY IS A SMOKER, IT IS IMPORTANT FOR YOUR HEALTH TO STOP SMOKING. PLEASE BE AWARE THAT SECOND HAND SMOKE IS ALSO HARMFUL."
Addended by: RENETTA URBAN on: 11/18/2024 03:52 PM     Modules accepted: Orders    
Statement Selected

## 2025-03-19 NOTE — ED PROVIDER NOTE - CADM POA URETHRAL CATHETER
Pt late canceled for speech therapy today due to family illness. This is the patient's first no show/late cancel per attendance policy, reinforced policy and clarified upcoming appointments.   
No

## 2025-04-17 NOTE — DISCHARGE NOTE PROVIDER - NSDCMRMEDTOKEN_GEN_ALL_CORE_FT
acetaminophen 325 mg oral tablet: 2 tab(s) orally every 6 hours, As needed, Moderate Pain (4 - 6)  amiodarone 100 mg oral tablet: 1 tab(s) orally once a day  baclofen 10 mg oral tablet: 1 tab(s) orally 3 times a day  Eliquis 5 mg oral tablet: 1 tab(s) orally 2 times a day  gabapentin 100 mg oral capsule: 1 cap(s) orally 3 times a day  Metoprolol Succinate ER 25 mg oral tablet, extended release: 1 tab(s) orally once a day  Vitamin C 500 mg oral capsule: 1 cap(s) orally once a day  Vitamin D3 2000 intl units oral tablet: 1 tab(s) orally once a day   acetaminophen 325 mg oral tablet: 2 tab(s) orally every 6 hours, As needed, Moderate Pain (4 - 6)  amiodarone 100 mg oral tablet: 1 tab(s) orally once a day  apixaban 5 mg oral tablet: 1 tab(s) orally every 12 hours  baclofen 10 mg oral tablet: 1 tab(s) orally 3 times a day  gabapentin 100 mg oral capsule: 1 cap(s) orally 3 times a day  Metoprolol Succinate ER 25 mg oral tablet, extended release: 1 tab(s) orally once a day  polyethylene glycol 3350 oral powder for reconstitution: 17 gram(s) orally once a day  senna leaf extract oral tablet: 2 tab(s) orally once a day (at bedtime)  simvastatin 10 mg oral tablet: 1 tab(s) orally once a day (at bedtime)  Vitamin C 500 mg oral capsule: 1 cap(s) orally once a day  Vitamin D3 2000 intl units oral tablet: 1 tab(s) orally once a day   Depression, unspecified depression type

## 2025-06-01 NOTE — DISCHARGE NOTE NURSING/CASE MANAGEMENT/SOCIAL WORK - NSDCPEPTSTRK_GEN_ALL_CORE
Patient has home medication regimen of Wellbutrin 300 mg,  Lexapro 20 mg, Seroquel 150 mg, Gabapentin 600 mg TID, Lamictal 50 mg daily  Will hold Wellbutrin in the setting of alcohol withdrawal as it lowers seizure threshold and patient has hx of withdrawal seizures   Continue other psychiatric medication    Call 911 for stroke/Need for follow up after discharge/Prescribed medications/Risk factors for stroke/Stroke education booklet/Stroke support groups for patients, families, and friends/Stroke warning signs and symptoms/Signs and symptoms of stroke

## (undated) DEVICE — SNARE LOOP POLY DISP 30MM LOOP

## (undated) DEVICE — KIT ENDO PROCEDURE CUST W/VLV

## (undated) DEVICE — SENSOR O2 FINGER ADULT 24/CA

## (undated) DEVICE — Device

## (undated) DEVICE — TUBING MEDI-VAC W MAXIGRIP CONNECTORS 1/4"X6'

## (undated) DEVICE — TUBING IV SET GRAVITY 3Y 100" MACRO

## (undated) DEVICE — CATH ELCTR GLIDE PRB 7FR

## (undated) DEVICE — NDL INJ SCLERO INTERJECT 23G

## (undated) DEVICE — RADIAL JAW 4 240CM WITH NDL

## (undated) DEVICE — CLAMP BX HOT RAD JAW 3

## (undated) DEVICE — RETRIEVER ROTH NET PLATINUM-UNIVERSAL

## (undated) DEVICE — TUBE RECTAL 24FR

## (undated) DEVICE — DRSG GAUZE 4X4"

## (undated) DEVICE — SYR LUER LOK 50CC

## (undated) DEVICE — SOL INJ NS 0.9% 500ML 1-PORT

## (undated) DEVICE — FORMALIN CUPS 10% BUFFERED

## (undated) DEVICE — LUBE JELLY FOILPACK 36GM STERILE

## (undated) DEVICE — TUBING CANNULA SALTER LABS NASAL ADULT 7FT